# Patient Record
Sex: MALE | Race: BLACK OR AFRICAN AMERICAN | NOT HISPANIC OR LATINO | Employment: OTHER | URBAN - NONMETROPOLITAN AREA
[De-identification: names, ages, dates, MRNs, and addresses within clinical notes are randomized per-mention and may not be internally consistent; named-entity substitution may affect disease eponyms.]

---

## 2017-04-05 ENCOUNTER — APPOINTMENT (OUTPATIENT)
Dept: LAB | Facility: HOSPITAL | Age: 82
End: 2017-04-05

## 2017-04-05 ENCOUNTER — OFFICE VISIT (OUTPATIENT)
Dept: FAMILY MEDICINE CLINIC | Facility: CLINIC | Age: 82
End: 2017-04-05

## 2017-04-05 VITALS
DIASTOLIC BLOOD PRESSURE: 70 MMHG | WEIGHT: 148 LBS | BODY MASS INDEX: 23.23 KG/M2 | SYSTOLIC BLOOD PRESSURE: 130 MMHG | HEIGHT: 67 IN

## 2017-04-05 DIAGNOSIS — M17.32 POST-TRAUMATIC OSTEOARTHRITIS OF LEFT KNEE: ICD-10-CM

## 2017-04-05 DIAGNOSIS — M16.52 POST-TRAUMATIC OSTEOARTHRITIS OF LEFT HIP: ICD-10-CM

## 2017-04-05 DIAGNOSIS — N40.0 BENIGN PROSTATIC HYPERPLASIA WITHOUT LOWER URINARY TRACT SYMPTOMS, UNSPECIFIED MORPHOLOGY: ICD-10-CM

## 2017-04-05 DIAGNOSIS — E55.9 VITAMIN D DEFICIENCY: ICD-10-CM

## 2017-04-05 DIAGNOSIS — K21.9 GASTROESOPHAGEAL REFLUX DISEASE WITHOUT ESOPHAGITIS: ICD-10-CM

## 2017-04-05 DIAGNOSIS — I10 ESSENTIAL HYPERTENSION: Primary | ICD-10-CM

## 2017-04-05 LAB
ALBUMIN SERPL-MCNC: 4.3 G/DL (ref 3.4–4.8)
ALBUMIN/GLOB SERPL: 1.2 G/DL (ref 1.1–1.8)
ALP SERPL-CCNC: 61 U/L (ref 38–126)
ALT SERPL W P-5'-P-CCNC: 14 U/L (ref 21–72)
ANION GAP SERPL CALCULATED.3IONS-SCNC: 14 MMOL/L (ref 5–15)
AST SERPL-CCNC: 35 U/L (ref 17–59)
BILIRUB SERPL-MCNC: 0.6 MG/DL (ref 0.2–1.3)
BUN BLD-MCNC: 18 MG/DL (ref 7–21)
BUN/CREAT SERPL: 15.4 (ref 7–25)
CALCIUM SPEC-SCNC: 9.9 MG/DL (ref 8.4–10.2)
CHLORIDE SERPL-SCNC: 101 MMOL/L (ref 95–110)
CO2 SERPL-SCNC: 25 MMOL/L (ref 22–31)
CREAT BLD-MCNC: 1.17 MG/DL (ref 0.7–1.3)
GFR SERPL CREATININE-BSD FRML MDRD: 72 ML/MIN/1.73 (ref 42–98)
GLOBULIN UR ELPH-MCNC: 3.5 GM/DL (ref 2.3–3.5)
GLUCOSE BLD-MCNC: 127 MG/DL (ref 60–100)
MAGNESIUM SERPL-MCNC: 2.1 MG/DL (ref 1.6–2.3)
POTASSIUM BLD-SCNC: 4.1 MMOL/L (ref 3.5–5.1)
PROT SERPL-MCNC: 7.8 G/DL (ref 6.3–8.6)
SODIUM BLD-SCNC: 140 MMOL/L (ref 137–145)

## 2017-04-05 PROCEDURE — 96372 THER/PROPH/DIAG INJ SC/IM: CPT | Performed by: FAMILY MEDICINE

## 2017-04-05 PROCEDURE — 80053 COMPREHEN METABOLIC PANEL: CPT | Performed by: FAMILY MEDICINE

## 2017-04-05 PROCEDURE — 99214 OFFICE O/P EST MOD 30 MIN: CPT | Performed by: FAMILY MEDICINE

## 2017-04-05 PROCEDURE — 36415 COLL VENOUS BLD VENIPUNCTURE: CPT | Performed by: FAMILY MEDICINE

## 2017-04-05 PROCEDURE — 83735 ASSAY OF MAGNESIUM: CPT | Performed by: FAMILY MEDICINE

## 2017-04-05 RX ORDER — INDAPAMIDE 2.5 MG/1
2.5 TABLET, FILM COATED ORAL DAILY
Qty: 90 TABLET | Refills: 3 | Status: SHIPPED | OUTPATIENT
Start: 2017-04-05 | End: 2018-04-18 | Stop reason: SDUPTHER

## 2017-04-05 RX ORDER — METOPROLOL SUCCINATE 50 MG/1
50 TABLET, EXTENDED RELEASE ORAL DAILY
Qty: 90 TABLET | Refills: 3 | Status: SHIPPED | OUTPATIENT
Start: 2017-04-05 | End: 2018-04-18 | Stop reason: SDUPTHER

## 2017-04-05 RX ORDER — DUTASTERIDE 0.5 MG/1
0.5 CAPSULE, LIQUID FILLED ORAL DAILY
Qty: 90 CAPSULE | Refills: 3 | Status: SHIPPED | OUTPATIENT
Start: 2017-04-05 | End: 2018-04-18 | Stop reason: SDUPTHER

## 2017-04-05 RX ORDER — TRIAMCINOLONE ACETONIDE 40 MG/ML
40 INJECTION, SUSPENSION INTRA-ARTICULAR; INTRAMUSCULAR ONCE
Status: COMPLETED | OUTPATIENT
Start: 2017-04-05 | End: 2017-04-05

## 2017-04-05 RX ORDER — ESOMEPRAZOLE MAGNESIUM 40 MG/1
40 CAPSULE, DELAYED RELEASE ORAL DAILY
Qty: 90 CAPSULE | Refills: 3 | Status: SHIPPED | OUTPATIENT
Start: 2017-04-05 | End: 2017-07-25 | Stop reason: SDUPTHER

## 2017-04-05 RX ORDER — ERGOCALCIFEROL 1.25 MG/1
50000 CAPSULE ORAL
Qty: 3 CAPSULE | Refills: 4 | Status: SHIPPED | OUTPATIENT
Start: 2017-04-05 | End: 2017-07-24 | Stop reason: SDUPTHER

## 2017-04-05 RX ORDER — POTASSIUM CHLORIDE 20 MEQ/1
20 TABLET, EXTENDED RELEASE ORAL DAILY
Qty: 90 TABLET | Refills: 3 | Status: SHIPPED | OUTPATIENT
Start: 2017-04-05 | End: 2018-04-18 | Stop reason: SDUPTHER

## 2017-04-05 RX ADMIN — TRIAMCINOLONE ACETONIDE 40 MG: 40 INJECTION, SUSPENSION INTRA-ARTICULAR; INTRAMUSCULAR at 07:48

## 2017-04-05 NOTE — PROGRESS NOTES
Subjective   Sterling Graves is a 86 y.o. male.     History of Present Illness reevaluation hypertension senile eczema BPH.  In interim having increasing arthritic complaints of right knee and leg following TRAUMA MANY YEARS AGO.  STATES BEEN TAKING ADVIL ABOUT ONCE A WEEK.  REQUESTING STEROID SHOT.  HAVE COUNSELED ON PROS AND CONS AND THE KNEE FOR NO PROLONGED STEROID USE.  HAVE ALSO COUNSELED ON THE POSSIBLE NEED FOR EVALUATION BY ORTHOPEDICS POST JOINT REPLACEMENT AND HE DOES NOT WISH SAME.  IT IS TIME FOR LAB.  GET A TETANUS WHEN HE VOLUNTEERS AT THE HOSPITAL.    The following portions of the patient's history were reviewed and updated as appropriate: allergies, current medications, past family history, past medical history, past social history, past surgical history and problem list.    Review of Systems   Constitutional: Negative for activity change, appetite change, fatigue and unexpected weight change.   HENT: Negative for trouble swallowing and voice change.    Eyes: Negative for redness and visual disturbance.   Respiratory: Negative for cough and wheezing.    Cardiovascular: Negative for chest pain and palpitations.   Gastrointestinal: Negative for abdominal pain, constipation, diarrhea, nausea and vomiting.   Genitourinary: Negative for urgency.   Musculoskeletal: Positive for arthralgias. Negative for joint swelling.   Neurological: Negative for syncope and headaches.   Hematological: Negative for adenopathy.   Psychiatric/Behavioral: Negative for sleep disturbance.       Objective   Physical Exam   HENT:   Head: Normocephalic.   Eyes: Pupils are equal, round, and reactive to light.   Neck: Normal range of motion. Neck supple.   Cardiovascular: Normal rate, regular rhythm, normal heart sounds and intact distal pulses.    Pulmonary/Chest: Effort normal and breath sounds normal.   Abdominal: Soft.   Musculoskeletal: Normal range of motion. He exhibits tenderness (lower extremity shows no swelling  mild crepitance range of motion of the knee and ankle.  1+ pulses no skin breakdown).   Neurological: He is alert.   hard of hearing   Skin: Skin is warm and dry.   Psychiatric: His speech is normal. His mood appears anxious. He exhibits abnormal recent memory.       Assessment/Plan   Problems Addressed this Visit        Cardiovascular and Mediastinum    Essential hypertension - Primary    Relevant Medications    indapamide (LOZOL) 2.5 MG tablet    metoprolol succinate XL (TOPROL-XL) 50 MG 24 hr tablet    potassium chloride (K-DUR,KLOR-CON) 20 MEQ CR tablet    Other Relevant Orders    Comprehensive Metabolic Panel    Magnesium       Digestive    Gastroesophageal reflux disease without esophagitis    Relevant Medications    esomeprazole (nexIUM) 40 MG capsule    Vitamin D deficiency    Relevant Medications    vitamin D (ERGOCALCIFEROL) 80570 UNITS capsule capsule       Musculoskeletal and Integument    Post-traumatic osteoarthritis of left knee    Relevant Medications    triamcinolone acetonide (KENALOG-40) injection 40 mg (Start on 4/5/2017  8:30 AM)    Post-traumatic osteoarthritis of left hip    Relevant Medications    triamcinolone acetonide (KENALOG-40) injection 40 mg (Start on 4/5/2017  8:30 AM)       Genitourinary    Benign prostatic hyperplasia    Relevant Medications    dutasteride (AVODART) 0.5 MG capsule           as above fall prevention discussed.  Lab work recheck 6 months

## 2017-04-18 ENCOUNTER — OFFICE VISIT (OUTPATIENT)
Dept: FAMILY MEDICINE CLINIC | Facility: CLINIC | Age: 82
End: 2017-04-18

## 2017-04-18 ENCOUNTER — HOSPITAL ENCOUNTER (INPATIENT)
Facility: HOSPITAL | Age: 82
LOS: 4 days | Discharge: HOME HEALTH | End: 2017-04-22
Attending: FAMILY MEDICINE | Admitting: FAMILY MEDICINE

## 2017-04-18 ENCOUNTER — APPOINTMENT (OUTPATIENT)
Dept: CT IMAGING | Facility: HOSPITAL | Age: 82
End: 2017-04-18

## 2017-04-18 VITALS
DIASTOLIC BLOOD PRESSURE: 70 MMHG | HEIGHT: 67 IN | SYSTOLIC BLOOD PRESSURE: 110 MMHG | WEIGHT: 142 LBS | BODY MASS INDEX: 22.29 KG/M2

## 2017-04-18 DIAGNOSIS — E86.0 DEHYDRATION: ICD-10-CM

## 2017-04-18 DIAGNOSIS — Z74.09 IMPAIRED MOBILITY AND ACTIVITIES OF DAILY LIVING: ICD-10-CM

## 2017-04-18 DIAGNOSIS — J18.9 PNEUMONIA OF RIGHT LOWER LOBE DUE TO INFECTIOUS ORGANISM: Primary | ICD-10-CM

## 2017-04-18 DIAGNOSIS — R26.9 ABNORMALITY OF GAIT AND MOBILITY: ICD-10-CM

## 2017-04-18 DIAGNOSIS — R10.84 GENERALIZED ABDOMINAL PAIN: Primary | ICD-10-CM

## 2017-04-18 DIAGNOSIS — Z78.9 IMPAIRED MOBILITY AND ACTIVITIES OF DAILY LIVING: ICD-10-CM

## 2017-04-18 LAB
ALBUMIN SERPL-MCNC: 3.8 G/DL (ref 3.4–4.8)
ALBUMIN/GLOB SERPL: 1 G/DL (ref 1.1–1.8)
ALP SERPL-CCNC: 77 U/L (ref 38–126)
ALT SERPL W P-5'-P-CCNC: 16 U/L (ref 21–72)
ANION GAP SERPL CALCULATED.3IONS-SCNC: 13 MMOL/L (ref 5–15)
AST SERPL-CCNC: 44 U/L (ref 17–59)
BACTERIA UR QL AUTO: ABNORMAL /HPF
BASOPHILS # BLD AUTO: 0 10*3/MM3 (ref 0–0.2)
BASOPHILS NFR BLD AUTO: 0 % (ref 0–2)
BILIRUB SERPL-MCNC: 0.9 MG/DL (ref 0.2–1.3)
BILIRUB UR QL STRIP: ABNORMAL
BUN BLD-MCNC: 28 MG/DL (ref 7–21)
BUN/CREAT SERPL: 28.6 (ref 7–25)
CALCIUM SPEC-SCNC: 9.2 MG/DL (ref 8.4–10.2)
CHLORIDE SERPL-SCNC: 96 MMOL/L (ref 95–110)
CLARITY UR: ABNORMAL
CO2 SERPL-SCNC: 27 MMOL/L (ref 22–31)
COLOR UR: ABNORMAL
CREAT BLD-MCNC: 0.98 MG/DL (ref 0.7–1.3)
D-LACTATE SERPL-SCNC: 1.6 MMOL/L (ref 0.5–2)
D-LACTATE SERPL-SCNC: 2.2 MMOL/L (ref 0.5–2)
DEPRECATED RDW RBC AUTO: 43.8 FL (ref 35.1–43.9)
EOSINOPHIL # BLD AUTO: 0.02 10*3/MM3 (ref 0–0.7)
EOSINOPHIL NFR BLD AUTO: 0.2 % (ref 0–7)
ERYTHROCYTE [DISTWIDTH] IN BLOOD BY AUTOMATED COUNT: 13.7 % (ref 11.5–14.5)
GFR SERPL CREATININE-BSD FRML MDRD: 88 ML/MIN/1.73 (ref 42–98)
GLOBULIN UR ELPH-MCNC: 3.7 GM/DL (ref 2.3–3.5)
GLUCOSE BLD-MCNC: 106 MG/DL (ref 60–100)
GLUCOSE UR STRIP-MCNC: NEGATIVE MG/DL
HCT VFR BLD AUTO: 36.2 % (ref 39–49)
HGB BLD-MCNC: 12.4 G/DL (ref 13.7–17.3)
HGB UR QL STRIP.AUTO: ABNORMAL
HOLD SPECIMEN: NORMAL
HOLD SPECIMEN: NORMAL
HYALINE CASTS UR QL AUTO: ABNORMAL /LPF
IMM GRANULOCYTES # BLD: 0.01 10*3/MM3 (ref 0–0.02)
IMM GRANULOCYTES NFR BLD: 0.1 % (ref 0–0.5)
KETONES UR QL STRIP: NEGATIVE
LEUKOCYTE ESTERASE UR QL STRIP.AUTO: ABNORMAL
LIPASE SERPL-CCNC: 42 U/L (ref 23–300)
LYMPHOCYTES # BLD AUTO: 1.07 10*3/MM3 (ref 0.6–4.2)
LYMPHOCYTES NFR BLD AUTO: 12.1 % (ref 10–50)
MCH RBC QN AUTO: 29.6 PG (ref 26.5–34)
MCHC RBC AUTO-ENTMCNC: 34.3 G/DL (ref 31.5–36.3)
MCV RBC AUTO: 86.4 FL (ref 80–98)
MONOCYTES # BLD AUTO: 1.55 10*3/MM3 (ref 0–0.9)
MONOCYTES NFR BLD AUTO: 17.5 % (ref 0–12)
NEUTROPHILS # BLD AUTO: 6.22 10*3/MM3 (ref 2–8.6)
NEUTROPHILS NFR BLD AUTO: 70.1 % (ref 37–80)
NITRITE UR QL STRIP: NEGATIVE
PH UR STRIP.AUTO: 6 [PH] (ref 5–9)
PLATELET # BLD AUTO: 157 10*3/MM3 (ref 150–450)
PMV BLD AUTO: 9.5 FL (ref 8–12)
POTASSIUM BLD-SCNC: 4.1 MMOL/L (ref 3.5–5.1)
PROT SERPL-MCNC: 7.5 G/DL (ref 6.3–8.6)
PROT UR QL STRIP: ABNORMAL
RBC # BLD AUTO: 4.19 10*6/MM3 (ref 4.37–5.74)
RBC # UR: ABNORMAL /HPF
REF LAB TEST METHOD: ABNORMAL
SODIUM BLD-SCNC: 136 MMOL/L (ref 137–145)
SP GR UR STRIP: 1.02 (ref 1–1.03)
SQUAMOUS #/AREA URNS HPF: ABNORMAL /HPF
TROPONIN I SERPL-MCNC: <0.012 NG/ML
UROBILINOGEN UR QL STRIP: ABNORMAL
WBC NRBC COR # BLD: 8.87 10*3/MM3 (ref 3.2–9.8)
WBC UR QL AUTO: ABNORMAL /HPF
WHOLE BLOOD HOLD SPECIMEN: NORMAL
WHOLE BLOOD HOLD SPECIMEN: NORMAL

## 2017-04-18 PROCEDURE — 94799 UNLISTED PULMONARY SVC/PX: CPT

## 2017-04-18 PROCEDURE — 25010000002 MORPHINE SULFATE (PF) 2 MG/ML SOLUTION: Performed by: HOSPITALIST

## 2017-04-18 PROCEDURE — 81001 URINALYSIS AUTO W/SCOPE: CPT | Performed by: FAMILY MEDICINE

## 2017-04-18 PROCEDURE — 25010000002 AZITHROMYCIN: Performed by: FAMILY MEDICINE

## 2017-04-18 PROCEDURE — 87040 BLOOD CULTURE FOR BACTERIA: CPT | Performed by: FAMILY MEDICINE

## 2017-04-18 PROCEDURE — 80053 COMPREHEN METABOLIC PANEL: CPT | Performed by: FAMILY MEDICINE

## 2017-04-18 PROCEDURE — 99284 EMERGENCY DEPT VISIT MOD MDM: CPT

## 2017-04-18 PROCEDURE — 83605 ASSAY OF LACTIC ACID: CPT | Performed by: FAMILY MEDICINE

## 2017-04-18 PROCEDURE — 84484 ASSAY OF TROPONIN QUANT: CPT | Performed by: FAMILY MEDICINE

## 2017-04-18 PROCEDURE — 25010000002 ONDANSETRON PER 1 MG: Performed by: FAMILY MEDICINE

## 2017-04-18 PROCEDURE — 74177 CT ABD & PELVIS W/CONTRAST: CPT

## 2017-04-18 PROCEDURE — 83690 ASSAY OF LIPASE: CPT | Performed by: FAMILY MEDICINE

## 2017-04-18 PROCEDURE — 25010000002 CEFTRIAXONE: Performed by: FAMILY MEDICINE

## 2017-04-18 PROCEDURE — 85025 COMPLETE CBC W/AUTO DIFF WBC: CPT | Performed by: FAMILY MEDICINE

## 2017-04-18 PROCEDURE — 94640 AIRWAY INHALATION TREATMENT: CPT

## 2017-04-18 PROCEDURE — 99214 OFFICE O/P EST MOD 30 MIN: CPT | Performed by: FAMILY MEDICINE

## 2017-04-18 PROCEDURE — 0 IOPAMIDOL 61 % SOLUTION: Performed by: FAMILY MEDICINE

## 2017-04-18 RX ORDER — SODIUM CHLORIDE 9 MG/ML
500 INJECTION, SOLUTION INTRAVENOUS ONCE
Status: COMPLETED | OUTPATIENT
Start: 2017-04-18 | End: 2017-04-18

## 2017-04-18 RX ORDER — IPRATROPIUM BROMIDE AND ALBUTEROL SULFATE 2.5; .5 MG/3ML; MG/3ML
3 SOLUTION RESPIRATORY (INHALATION)
Status: DISCONTINUED | OUTPATIENT
Start: 2017-04-18 | End: 2017-04-22 | Stop reason: HOSPADM

## 2017-04-18 RX ORDER — SODIUM CHLORIDE 0.9 % (FLUSH) 0.9 %
10 SYRINGE (ML) INJECTION AS NEEDED
Status: DISCONTINUED | OUTPATIENT
Start: 2017-04-18 | End: 2017-04-22 | Stop reason: HOSPADM

## 2017-04-18 RX ORDER — INDAPAMIDE 2.5 MG/1
2.5 TABLET, FILM COATED ORAL DAILY
Status: DISCONTINUED | OUTPATIENT
Start: 2017-04-18 | End: 2017-04-22 | Stop reason: HOSPADM

## 2017-04-18 RX ORDER — SODIUM CHLORIDE 0.9 % (FLUSH) 0.9 %
1-10 SYRINGE (ML) INJECTION AS NEEDED
Status: DISCONTINUED | OUTPATIENT
Start: 2017-04-18 | End: 2017-04-22 | Stop reason: HOSPADM

## 2017-04-18 RX ORDER — ALBUTEROL SULFATE 2.5 MG/3ML
2.5 SOLUTION RESPIRATORY (INHALATION)
Status: DISCONTINUED | OUTPATIENT
Start: 2017-04-18 | End: 2017-04-18

## 2017-04-18 RX ORDER — METOPROLOL SUCCINATE 50 MG/1
50 TABLET, EXTENDED RELEASE ORAL DAILY
Status: DISCONTINUED | OUTPATIENT
Start: 2017-04-18 | End: 2017-04-22 | Stop reason: HOSPADM

## 2017-04-18 RX ORDER — MORPHINE SULFATE 2 MG/ML
1 INJECTION, SOLUTION INTRAMUSCULAR; INTRAVENOUS EVERY 4 HOURS PRN
Status: DISCONTINUED | OUTPATIENT
Start: 2017-04-18 | End: 2017-04-22 | Stop reason: HOSPADM

## 2017-04-18 RX ORDER — SODIUM CHLORIDE 9 MG/ML
75 INJECTION, SOLUTION INTRAVENOUS CONTINUOUS
Status: DISCONTINUED | OUTPATIENT
Start: 2017-04-18 | End: 2017-04-22 | Stop reason: HOSPADM

## 2017-04-18 RX ORDER — PANTOPRAZOLE SODIUM 40 MG/1
40 TABLET, DELAYED RELEASE ORAL
Status: DISCONTINUED | OUTPATIENT
Start: 2017-04-19 | End: 2017-04-22 | Stop reason: HOSPADM

## 2017-04-18 RX ORDER — ACETAMINOPHEN 325 MG/1
650 TABLET ORAL EVERY 4 HOURS PRN
Status: DISCONTINUED | OUTPATIENT
Start: 2017-04-18 | End: 2017-04-22 | Stop reason: HOSPADM

## 2017-04-18 RX ORDER — ONDANSETRON 4 MG/1
4 TABLET, ORALLY DISINTEGRATING ORAL EVERY 6 HOURS PRN
Status: DISCONTINUED | OUTPATIENT
Start: 2017-04-18 | End: 2017-04-22 | Stop reason: HOSPADM

## 2017-04-18 RX ORDER — DUTASTERIDE 0.5 MG/1
0.5 CAPSULE, LIQUID FILLED ORAL DAILY
Status: DISCONTINUED | OUTPATIENT
Start: 2017-04-18 | End: 2017-04-22 | Stop reason: HOSPADM

## 2017-04-18 RX ORDER — ONDANSETRON 2 MG/ML
4 INJECTION INTRAMUSCULAR; INTRAVENOUS EVERY 6 HOURS PRN
Status: DISCONTINUED | OUTPATIENT
Start: 2017-04-18 | End: 2017-04-22 | Stop reason: HOSPADM

## 2017-04-18 RX ORDER — DOCUSATE SODIUM 100 MG/1
100 CAPSULE, LIQUID FILLED ORAL 2 TIMES DAILY
Status: DISCONTINUED | OUTPATIENT
Start: 2017-04-18 | End: 2017-04-22 | Stop reason: HOSPADM

## 2017-04-18 RX ORDER — ONDANSETRON 4 MG/1
4 TABLET, FILM COATED ORAL EVERY 6 HOURS PRN
Status: DISCONTINUED | OUTPATIENT
Start: 2017-04-18 | End: 2017-04-22 | Stop reason: HOSPADM

## 2017-04-18 RX ORDER — POLYETHYLENE GLYCOL 3350 17 G/17G
17 POWDER, FOR SOLUTION ORAL DAILY
Status: DISCONTINUED | OUTPATIENT
Start: 2017-04-18 | End: 2017-04-22 | Stop reason: HOSPADM

## 2017-04-18 RX ORDER — ONDANSETRON 2 MG/ML
4 INJECTION INTRAMUSCULAR; INTRAVENOUS ONCE
Status: COMPLETED | OUTPATIENT
Start: 2017-04-18 | End: 2017-04-18

## 2017-04-18 RX ORDER — POTASSIUM CHLORIDE 750 MG/1
20 CAPSULE, EXTENDED RELEASE ORAL DAILY
Status: DISCONTINUED | OUTPATIENT
Start: 2017-04-18 | End: 2017-04-22 | Stop reason: HOSPADM

## 2017-04-18 RX ADMIN — DOCUSATE SODIUM 100 MG: 100 CAPSULE, LIQUID FILLED ORAL at 22:23

## 2017-04-18 RX ADMIN — ONDANSETRON 4 MG: 2 INJECTION INTRAMUSCULAR; INTRAVENOUS at 14:53

## 2017-04-18 RX ADMIN — SODIUM CHLORIDE 500 ML: 9 INJECTION, SOLUTION INTRAVENOUS at 14:54

## 2017-04-18 RX ADMIN — IOPAMIDOL 75 ML: 612 INJECTION, SOLUTION INTRAVENOUS at 16:30

## 2017-04-18 RX ADMIN — SODIUM CHLORIDE 75 ML/HR: 900 INJECTION, SOLUTION INTRAVENOUS at 22:26

## 2017-04-18 RX ADMIN — AZITHROMYCIN 500 MG: 500 INJECTION, POWDER, LYOPHILIZED, FOR SOLUTION INTRAVENOUS at 19:08

## 2017-04-18 RX ADMIN — Medication 10 ML: at 16:30

## 2017-04-18 RX ADMIN — IPRATROPIUM BROMIDE AND ALBUTEROL SULFATE 3 ML: 2.5; .5 SOLUTION RESPIRATORY (INHALATION) at 22:59

## 2017-04-18 RX ADMIN — POLYETHYLENE GLYCOL 3350 17 G: 17 POWDER, FOR SOLUTION ORAL at 22:23

## 2017-04-18 RX ADMIN — CEFTRIAXONE 1 G: 1 INJECTION, POWDER, FOR SOLUTION INTRAMUSCULAR; INTRAVENOUS at 16:33

## 2017-04-18 RX ADMIN — MORPHINE SULFATE 1 MG: 2 INJECTION, SOLUTION INTRAMUSCULAR; INTRAVENOUS at 18:55

## 2017-04-18 RX ADMIN — Medication 10 ML: at 13:53

## 2017-04-18 NOTE — H&P
"      [facility] Medicine Admission      Date of Admission: 2017      Primary Care Physician: Augustin Jones MD      Chief Complaint: Vomiting, right flank pain    HPI: 86-year-old -American male who presented to the emergency department today after being sent from his primary care provider's office.  Primary care provider reported that the patient was having abdominal pain, constipation, nausea and vomiting and there was concern for acute abdomen.  However workup in the emergency department including CT of the abdomen and pelvis was unremarkable for any acute abdomen, however the patient was noted to have right lower lobe pneumonia.  Upon evaluation in the emergency department the patient makes no mention of abdominal pain.  He reports that he has had nausea and vomiting for the past 4 days in approximately 6-8 pound weight loss.  His grandson reports that he has been having chills and \"rattling when he breathes.\"  He is being admitted for treatment of right lower lobe pneumonia.    Concurrent Medical History: BPH, hypertension, osteoarthritis, peptic ulcer disease, vertigo, esophagitis    Past Surgical History: Bilateral leg fracture repairs, G-tube placement, right total knee, right shoulder rotator cuff repair, cataract surgery    Family History: Family history significant for type 2 diabetes in the patient's mother is  patient also reports having siblings with history of type 2 diabetes and cancer however the patient is unable to recall what type of cancer her sister had.    Social History: Patient denies alcohol or illicit drug use.  Patient is a former pipe smoker but has not smoked since .    Allergies: No Known Allergies    Medications:    Current medications include:  Avodart 0.5 mg daily  Nexium 40 mg daily  Lozol 2.5 mg daily  Toprol-XL 50 mg daily  Potassium 20 mEq daily  Vitamin D 50,000 units once a month    Review of Systems:  Review of " "Systems   Constitutional: Positive for chills and fatigue. Negative for fever.   Respiratory: Positive for cough. Negative for chest tightness, shortness of breath and wheezing.         \"rattling in my chest\"   Cardiovascular: Negative for chest pain and palpitations.   Gastrointestinal: Positive for constipation, nausea and vomiting. Negative for diarrhea.   Genitourinary: Positive for flank pain.        Right flank pain   Musculoskeletal: Positive for back pain, myalgias and neck pain.   Neurological: Negative for dizziness, weakness and light-headedness.   Psychiatric/Behavioral: Negative for confusion.      Otherwise complete ROS is negative except as mentioned above.    Physical Exam:   Temp:  [98.9 °F (37.2 °C)] 98.9 °F (37.2 °C)  Heart Rate:  [71-78] 73  Resp:  [16-20] 20  BP: (110-188)/(58-89) 177/75  Physical Exam   Constitutional: He is oriented to person, place, and time. Vital signs are normal. He appears cachectic. He is cooperative.   HENT:   Head: Normocephalic.   Eyes: Conjunctivae are normal.   Neck: Neck supple.   Cardiovascular: Normal rate, regular rhythm, normal heart sounds and intact distal pulses.    Pulmonary/Chest: Effort normal. No respiratory distress. He has decreased breath sounds in the right lower field and the left lower field.   Abdominal: Soft. Bowel sounds are normal. He exhibits no distension. There is no tenderness.   Neurological: He is alert and oriented to person, place, and time.   Skin: Skin is warm and dry.   Psychiatric: He has a normal mood and affect. His behavior is normal.   Vitals reviewed.        Results Reviewed:  I have personally reviewed current lab, radiology, and data and agree with results.  Lab Results (last 24 hours)     Procedure Component Value Units Date/Time    CBC & Differential [[id number]] Collected:  04/18/17 1353    Specimen:  Blood Updated:  04/18/17 1406    Narrative:       The following orders were created for panel order CBC & " Differential.  Procedure                               Abnormality         Status                     ---------                               -----------         ------                     CBC Auto Differential[[id number]]         Abnormal            Final result                 Please view results for these tests on the individual orders.    CBC Auto Differential [[id number]]  (Abnormal) Collected:  04/18/17 1353    Specimen:  Blood Updated:  04/18/17 1406     WBC 8.87 10*3/mm3      RBC 4.19 (L) 10*6/mm3      Hemoglobin 12.4 (L) g/dL      Hematocrit 36.2 (L) %      MCV 86.4 fL      MCH 29.6 pg      MCHC 34.3 g/dL      RDW 13.7 %      RDW-SD 43.8 fl      MPV 9.5 fL      Platelets 157 10*3/mm3      Neutrophil % 70.1 %      Lymphocyte % 12.1 %      Monocyte % 17.5 (H) %      Eosinophil % 0.2 %      Basophil % 0.0 %      Immature Grans % 0.1 %      Neutrophils, Absolute 6.22 10*3/mm3      Lymphocytes, Absolute 1.07 10*3/mm3      Monocytes, Absolute 1.55 (H) 10*3/mm3      Eosinophils, Absolute 0.02 10*3/mm3      Basophils, Absolute 0.00 10*3/mm3      Immature Grans, Absolute 0.01 10*3/mm3     Comprehensive Metabolic Panel [[id number]]  (Abnormal) Collected:  04/18/17 1353    Specimen:  Blood Updated:  04/18/17 1417     Glucose 106 (H) mg/dL      BUN 28 (H) mg/dL      Creatinine 0.98 mg/dL      Sodium 136 (L) mmol/L      Potassium 4.1 mmol/L      Chloride 96 mmol/L      CO2 27.0 mmol/L      Calcium 9.2 mg/dL      Total Protein 7.5 g/dL      Albumin 3.80 g/dL      ALT (SGPT) 16 (L) U/L      AST (SGOT) 44 U/L      Alkaline Phosphatase 77 U/L      Total Bilirubin 0.9 mg/dL      eGFR  African Amer 88 mL/min/1.73      Globulin 3.7 (H) gm/dL      A/G Ratio 1.0 (L) g/dL      BUN/Creatinine Ratio 28.6 (H)     Anion Gap 13.0 mmol/L     Narrative:       The MDRD GFR formula is only valid for adults with stable renal function between ages 18 and 70.    Lipase [[id number]]  (Normal) Collected:  04/18/17 1353    Specimen:  Blood Updated:   04/18/17 1417     Lipase 42 U/L     Troponin [[id number]]  (Normal) Collected:  04/18/17 1353    Specimen:  Blood Updated:  04/18/17 1429     Troponin I <0.012 ng/mL     [place] Draw [[id number]] Collected:  04/18/17 1353    Specimen:  Blood Updated:  04/18/17 1502    Narrative:       The following orders were created for panel order [place] Draw.  Procedure                               Abnormality         Status                     ---------                               -----------         ------                     Light Blue Top[[id number]]                                    Final result               Green Top (Gel)[[id number]]                                   Final result               Lavender Top[[id number]]                                      Final result               Gold Top - SST[[id number]]                                    Final result                 Please view results for these tests on the individual orders.    Light Blue Top [[id number]] Collected:  04/18/17 1353    Specimen:  Blood Updated:  04/18/17 1502     Extra Tube hold for add-on      Auto resulted       Green Top (Gel) [[id number]] Collected:  04/18/17 1353    Specimen:  Blood Updated:  04/18/17 1502     Extra Tube Hold for add-ons.      Auto resulted.       Lavender Top [[id number]] Collected:  04/18/17 1353    Specimen:  Blood Updated:  04/18/17 1502     Extra Tube hold for add-on      Auto resulted       Gold Top - SST [[id number]] Collected:  04/18/17 1353    Specimen:  Blood Updated:  04/18/17 1502     Extra Tube Hold for add-ons.      Auto resulted.       Urinalysis With / Culture If Indicated [[id number]]  (Abnormal) Collected:  04/18/17 1438    Specimen:  Urine from Urine, Clean Catch Updated:  04/18/17 1505     Color, UA Orange (A)     Appearance, UA Slightly Cloudy (A)     pH, UA 6.0     Specific Gravity, UA 1.020     Glucose, UA Negative     Ketones, UA Negative     Bilirubin, UA Small (1+) (A)     Blood, UA Trace (A)     Protein,  mg/dL  (2+) (A)     Leuk Esterase, UA Trace (A)     Nitrite, UA Negative     Urobilinogen, UA 4.0 E.U./dL (A)    Lactic Acid, Plasma [[id number]]  (Abnormal) Collected:  04/18/17 1456    Specimen:  Blood from Arm, Left Updated:  04/18/17 1523     Lactate 2.2 (C) mmol/L     Urinalysis, Microscopic Only [[id number]]  (Abnormal) Collected:  04/18/17 1438    Specimen:  Urine from Urine, Clean Catch Updated:  04/18/17 1643     RBC, UA None Seen /HPF      WBC, UA 3-5 /HPF      Bacteria, UA Trace (A) /HPF      Squamous Epithelial Cells, UA 0-2 /HPF      Hyaline Casts, UA 0-2 /LPF      Methodology Automated Microscopy        Imaging Results (last 24 hours)     Procedure Component Value Units Date/Time    CT Abdomen Pelvis With Contrast [[id number]] Collected:  04/18/17 1640     Updated:  04/18/17 1655    Narrative:         EXAMINATION:  Computed Tomography           REGION:    Abdomen / Pelvis                     INDICATION:    Generalized abdominal pain      HISTORY:    Prostate carcinoma      LINDA. IMAGING:    none            TECHNIQUE:      - reconstructions:    axial, coronal, sagittal         - contrast:      oral:  Yes ;   intravenous:  Yes -Isovue 300,  80 mL     This exam was performed according to the departmental  dose-optimization program which includes automated exposure  control, adjustment of the mA and/or kV according to patient size  and/or use of iterative reconstruction technique.           COMMENTS:            - - - CT ABDOMEN - - -          THORAX (INFERIOR):      - LUNG BASES:  clear      - PLEURA:    no fluid or mass      - HEART:    normal size, no pericardial fluid     - MISC:      n/a          ABDOMEN:     - LIVER:    normal size / contour, no ductal dilatation , single  focal hypoattenuating lesion in the left lobe measuring 0.8 cm,  nonenhancing, presumably of a benign etiology.   - GB:      grossly negative    - CBD:      grossly negative   - SPLEEN:    normal size and contour   - PANCREAS:    normal in size,  contour, no focal mass    - VISCERA:    normal caliber, no wall thickening     - MESENTERY:  no mesenteric mass   - CAVITY:    no free abdominal fluid, no free intraperitoneal  air   - BODY WALL:  wnl   - OSSEOUS:  Degenerative changes associated with the inferior  lumbar spine with moderate to high-grade acquired spinal stenosis  at the L4/5 and L3/4 levels with high-grade spinal neural  foraminal narrowing on the right at these levels.   - MISC:                                      RETROPERITONEUM:   - KIDNEYS:    normal size / contour, no collecting system  dilation; multiple nonenhancing hypoattenuating lesions, likely  cysts. No evidence of an enhancing mass.   - URETERS:    normal course, caliber   - ADRENALS:    normal size, contour   - MISC:      no sig retroperitoneal adenopathy or mass   - VASCULAR:    aorta / iliacs: wnl for age     - - - CT PELVIS - - -      - VISCERA:    normal caliber small/large bowel, no focal  thickening/mass    - MESENTERY:  no mass   - VASCULAR:    wnl for age   - CAVITY:    no free fluid / air   - BLADDER:    unremarkable   - OSSEOUS:    wnl    - MISC:   - PROSTATE:  1.3 cm bulge in the right peripheral zone with mild  enhancement, suspicious for the presence of a neoplastic process.  Suggest correlation with PSA. (Axial 78/103)     .       Impression:        CONCLUSION:  1. No evidence of an acute intra-abdominal/pelvic process.  2. Right lower lobe airspace disease likely representing  pneumonia.  3. Multilevel degenerative changes in the lumbar spine as  described above, suggest correlation with MRI.  4. Nodule in the prostate gland, suspicious for the presence of a  neoplastic process, and correlation with PSA is recommended.  Biopsy suggested.      Electronically signed by:  GLENIS Gutierrez MD  4/18/2017 4:54  PM CDT Workstation: [id number]            Assessment/Plan:  1.  Right lower lobe pneumonia: IV antibiotics, nebulizer treatments, oxygen therapy, incentive  spirometry.  #2.  Acute cystitis: Cultures pending, IV antibiotics will be given.  #3.  Mild dehydration: IV hydration  #4 hypertension: Continue patient's home medications.  #5 history of peptic ulcer disease  #6 history BPH  #7 prostate nodules noted on CT scan    Plan of care has been discussed with the patient and his grandchildren who are at bedside.  Approximately 1 hour spent on the admission process for this patient.  Further orders and the patient will depend upon the hospital course.  CODE STATUS was discussed with the patient at this time he wishes to be full CODE STATUS.    Magda Chavez, LEONIE  04/18/17  5:54 PM

## 2017-04-18 NOTE — PROGRESS NOTES
Subjective   Sterling Graves is a 86 y.o. male.     History of Present Illness chief complaint of abdominal pain no bowel movement past 4-5 days.  Pain in right side and mainly.  Decreased oral intake associated with same.  No known fever.  Past history significant for inguinal hernia repair.    The following portions of the patient's history were reviewed and updated as appropriate: allergies, current medications, past family history, past medical history, past social history, past surgical history and problem list.    Review of Systems   Constitutional: Positive for unexpected weight change. Negative for activity change, appetite change and fatigue.   HENT: Negative for trouble swallowing and voice change.    Eyes: Negative for redness and visual disturbance.   Respiratory: Negative for cough and wheezing.    Cardiovascular: Negative for chest pain and palpitations.   Gastrointestinal: Positive for abdominal distention, abdominal pain, constipation and nausea. Negative for diarrhea and vomiting.   Genitourinary: Negative for urgency.   Musculoskeletal: Negative for joint swelling.   Neurological: Negative for syncope and headaches.   Hematological: Negative for adenopathy.   Psychiatric/Behavioral: Negative for sleep disturbance.       Objective   Physical Exam   Constitutional: He appears cachectic. He has a sickly appearance.   HENT:   Head: Normocephalic.   Neck: Trachea normal.   Cardiovascular: Normal rate, regular rhythm, S1 normal and normal heart sounds.    Rate 90   Pulmonary/Chest: Effort normal and breath sounds normal.   Abdominal: Bowel sounds are absent. There is tenderness (mild diffuse tenderness.  Bowel sounds.No guarding rebound but not reproducible.).   Neurological:   Reflex Scores:       Patellar reflexes are 2+ on the right side and 2+ on the left side.  Psychiatric: His speech is normal. His mood appears anxious.       Assessment/Plan   Sterling was seen today for abdominal  pain.    Diagnoses and all orders for this visit:    Generalized abdominal pain    Dehydration       Dehydration and acute pain no bowel sounds possible surgical abdomen.  Patient transferred to emergency room.  Emergency room has been notified.

## 2017-04-18 NOTE — ED PROVIDER NOTES
Subjective   Patient is a 86 y.o. male presenting with abdominal pain.   History provided by:  Patient  Abdominal Pain   Pain location:  Generalized  Pain quality: aching, dull and fullness    Pain radiates to:  Does not radiate  Pain severity:  Moderate  Onset quality:  Sudden  Duration:  2 days  Timing:  Constant  Progression:  Worsening  Chronicity:  New  Context: retching    Context: not alcohol use, not awakening from sleep, not diet changes, not eating, not medication withdrawal, not previous surgeries, not recent illness, not recent sexual activity and not sick contacts    Relieved by:  Nothing  Worsened by:  Nothing  Ineffective treatments:  None tried  Associated symptoms: no anorexia, no belching, no chest pain, no chills, no cough, no dysuria, no fatigue, no fever, no flatus, no melena, no nausea and no shortness of breath        Review of Systems   Constitutional: Negative.  Negative for chills, fatigue and fever.   HENT: Negative.    Respiratory: Negative.  Negative for cough and shortness of breath.    Cardiovascular: Negative.  Negative for chest pain.   Gastrointestinal: Positive for abdominal pain. Negative for anorexia, flatus, melena and nausea.   Genitourinary: Negative for dysuria.   Musculoskeletal: Negative.    Hematological: Negative.    Psychiatric/Behavioral: Negative.        Past Medical History:   Diagnosis Date   • Allergic rhinitis    • Benign prostatic hyperplasia    • Cataract    • Chalazion of left lower eyelid    • Corneal abrasion, left    • Degenerative joint disease involving multiple joints    • Dizziness    • Eczema    • Essential hypertension    • Hearing loss    • History of pneumococcal vaccination    • Inguinal hernia    • Long term use of drug    • Male erectile disorder    • Onychomycosis    • Peptic ulcer    • Pseudophakia    • Rotator cuff syndrome     right shoulder   • Upper respiratory infection    • Vertigo    • Vitreous detachment     posterior with small  "hemorrhage          No Known Allergies    Past Surgical History:   Procedure Laterality Date   • ANKLE SURGERY  05/12/1960   • CATARACT EXTRACTION  02/13/2002   • ENDOSCOPY  11/08/2011    w/tube   • FOOT SURGERY  11/04/1997   • GTUBE INSERTION  11/11/2011   • INGUINAL HERNIA REPAIR  05/24/1999   • KNEE SURGERY  11/09/1991   • KNEE SURGERY  09/14/1990   • SHOULDER SURGERY  10/12/2004   • SKIN BIOPSY  10/23/2002   • TOTAL KNEE ARTHROPLASTY  09/23/1991       Family History   Problem Relation Age of Onset   • Hypertension Other        Social History     Social History   • Marital status:      Spouse name: N/A   • Number of children: N/A   • Years of education: N/A     Social History Main Topics   • Smoking status: Never Smoker   • Smokeless tobacco: None   • Alcohol use No   • Drug use: None   • Sexual activity: Not Asked     Other Topics Concern   • None     Social History Narrative           Objective    /84 (BP Location: Right arm, Patient Position: Lying)  Pulse 70  Temp 98.9 °F (37.2 °C) (Oral)   Resp 18  Ht 67\" (170.2 cm)  Wt 142 lb (64.4 kg)  SpO2 96%  BMI 22.24 kg/m2    Physical Exam   Constitutional: He is oriented to person, place, and time. He appears well-developed and well-nourished.   HENT:   Head: Normocephalic and atraumatic.   Right Ear: External ear normal.   Left Ear: External ear normal.   Nose: Nose normal.   Mouth/Throat: Oropharynx is clear and moist.   Eyes: Conjunctivae and EOM are normal. Pupils are equal, round, and reactive to light.   Neck: Normal range of motion. Neck supple.   Cardiovascular: Normal rate, regular rhythm, normal heart sounds and intact distal pulses.    Pulmonary/Chest: Effort normal and breath sounds normal. No accessory muscle usage. No respiratory distress. He exhibits tenderness. He exhibits no deformity.       Abdominal: Soft. Bowel sounds are decreased. There is tenderness in the right lower quadrant, periumbilical area and suprapubic area. "   Musculoskeletal: Normal range of motion.   Neurological: He is alert and oriented to person, place, and time. He has normal reflexes.   Skin: Skin is warm.   Psychiatric: He has a normal mood and affect. His behavior is normal. Judgment and thought content normal.   Nursing note and vitals reviewed.      Procedures         ED Course  ED Course      Labs Reviewed   COMPREHENSIVE METABOLIC PANEL - Abnormal; Notable for the following:        Result Value    Glucose 106 (*)     BUN 28 (*)     Sodium 136 (*)     ALT (SGPT) 16 (*)     Globulin 3.7 (*)     A/G Ratio 1.0 (*)     BUN/Creatinine Ratio 28.6 (*)     All other components within normal limits    Narrative:     The MDRD GFR formula is only valid for adults with stable renal function between ages 18 and 70.   URINALYSIS W/ CULTURE IF INDICATED - Abnormal; Notable for the following:     Color, UA Orange (*)     Appearance, UA Slightly Cloudy (*)     Bilirubin, UA Small (1+) (*)     Blood, UA Trace (*)     Protein,  mg/dL (2+) (*)     Leuk Esterase, UA Trace (*)     Urobilinogen, UA 4.0 E.U./dL (*)     All other components within normal limits   CBC WITH AUTO DIFFERENTIAL - Abnormal; Notable for the following:     RBC 4.19 (*)     Hemoglobin 12.4 (*)     Hematocrit 36.2 (*)     Monocyte % 17.5 (*)     Monocytes, Absolute 1.55 (*)     All other components within normal limits   LACTIC ACID, PLASMA - Abnormal; Notable for the following:     Lactate 2.2 (*)     All other components within normal limits   URINALYSIS, MICROSCOPIC ONLY - Abnormal; Notable for the following:     Bacteria, UA Trace (*)     All other components within normal limits   LIPASE - Normal   TROPONIN (IN-HOUSE) - Normal   BLOOD CULTURE   BLOOD CULTURE   RAINBOW DRAW    Narrative:     The following orders were created for panel order [place] Draw.  Procedure                               Abnormality         Status                     ---------                               -----------          ------                     Light Blue Top[[id number]]                                    Final result               Green Top (Gel)[[id number]]                                   Final result               Lavender Top[[id number]]                                      Final result               Gold Top - SST[[id number]]                                    Final result                 Please view results for these tests on the individual orders.   LACTATE ACID, REFLEX   CBC AND DIFFERENTIAL    Narrative:     The following orders were created for panel order CBC & Differential.  Procedure                               Abnormality         Status                     ---------                               -----------         ------                     CBC Auto Differential[[id number]]         Abnormal            Final result                 Please view results for these tests on the individual orders.   LIGHT BLUE TOP   GREEN TOP   LAVENDER TOP   GOLD TOP - SST     Ct Abdomen Pelvis With Contrast    Result Date: 4/18/2017  Narrative: EXAMINATION:  Computed Tomography         REGION:    Abdomen / Pelvis                 INDICATION:    Generalized abdominal pain    HISTORY:    Prostate carcinoma    LINDA. IMAGING:    none        TECHNIQUE:    - reconstructions:    axial, coronal, sagittal       - contrast:      oral:  Yes ;   intravenous:  Yes -Isovue 300, 80 mL   This exam was performed according to the departmental dose-optimization program which includes automated exposure control, adjustment of the mA and/or kV according to patient size and/or use of iterative reconstruction technique.       COMMENTS:        - - - CT ABDOMEN - - -      THORAX (INFERIOR):    - LUNG BASES:  clear    - PLEURA:    no fluid or mass    - HEART:    normal size, no pericardial fluid   - MISC:      n/a      ABDOMEN:   - LIVER:    normal size / contour, no ductal dilatation , single focal hypoattenuating lesion in the left lobe measuring 0.8 cm,  nonenhancing, presumably of a benign etiology.  - GB:      grossly negative  - CBD:      grossly negative  - SPLEEN:    normal size and contour  - PANCREAS:    normal in size, contour, no focal mass  - VISCERA:    normal caliber, no wall thickening   - MESENTERY:  no mesenteric mass  - CAVITY:    no free abdominal fluid, no free intraperitoneal air  - BODY WALL:  wnl  - OSSEOUS:  Degenerative changes associated with the inferior lumbar spine with moderate to high-grade acquired spinal stenosis at the L4/5 and L3/4 levels with high-grade spinal neural foraminal narrowing on the right at these levels.  - MISC:                                  RETROPERITONEUM:  - KIDNEYS:    normal size / contour, no collecting system dilation; multiple nonenhancing hypoattenuating lesions, likely cysts. No evidence of an enhancing mass.  - URETERS:    normal course, caliber  - ADRENALS:    normal size, contour  - MISC:      no sig retroperitoneal adenopathy or mass  - VASCULAR:    aorta / iliacs: wnl for age  - - - CT PELVIS - - -  - VISCERA:    normal caliber small/large bowel, no focal thickening/mass    - MESENTERY:  no mass  - VASCULAR:    wnl for age  - CAVITY:    no free fluid / air  - BLADDER:    unremarkable  - OSSEOUS:    wnl  - MISC:  - PROSTATE:  1.3 cm bulge in the right peripheral zone with mild enhancement, suspicious for the presence of a neoplastic process. Suggest correlation with PSA. (Axial 78/103)  .       Impression:  CONCLUSION: 1. No evidence of an acute intra-abdominal/pelvic process. 2. Right lower lobe airspace disease likely representing pneumonia. 3. Multilevel degenerative changes in the lumbar spine as described above, suggest correlation with MRI. 4. Nodule in the prostate gland, suspicious for the presence of a neoplastic process, and correlation with PSA is recommended. Biopsy suggested. Electronically signed by:  GLENIS Gutierrez MD  4/18/2017 4:54 PM CDT Workstation: [id number]    Pneumonia with  elevated BUN   CURB score is high - admit for iv antibiotics.                 MDM  Number of Diagnoses or Management Options  Dehydration:   Pneumonia of right lower lobe due to infectious organism:       Final diagnoses:   Pneumonia of right lower lobe due to infectious organism   Dehydration            Jen Pelayo MD  04/18/17 [id number]

## 2017-04-19 LAB
ANION GAP SERPL CALCULATED.3IONS-SCNC: 10 MMOL/L (ref 5–15)
BUN BLD-MCNC: 17 MG/DL (ref 7–21)
BUN/CREAT SERPL: 21.8 (ref 7–25)
CALCIUM SPEC-SCNC: 8.8 MG/DL (ref 8.4–10.2)
CHLORIDE SERPL-SCNC: 103 MMOL/L (ref 95–110)
CO2 SERPL-SCNC: 25 MMOL/L (ref 22–31)
CREAT BLD-MCNC: 0.78 MG/DL (ref 0.7–1.3)
D-LACTATE SERPL-SCNC: 1.7 MMOL/L (ref 0.5–2)
DEPRECATED RDW RBC AUTO: 43.7 FL (ref 35.1–43.9)
ERYTHROCYTE [DISTWIDTH] IN BLOOD BY AUTOMATED COUNT: 13.7 % (ref 11.5–14.5)
GFR SERPL CREATININE-BSD FRML MDRD: 114 ML/MIN/1.73 (ref 42–98)
GLUCOSE BLD-MCNC: 113 MG/DL (ref 60–100)
HCT VFR BLD AUTO: 35.5 % (ref 39–49)
HGB BLD-MCNC: 12.1 G/DL (ref 13.7–17.3)
MCH RBC QN AUTO: 29.7 PG (ref 26.5–34)
MCHC RBC AUTO-ENTMCNC: 34.1 G/DL (ref 31.5–36.3)
MCV RBC AUTO: 87.2 FL (ref 80–98)
PLATELET # BLD AUTO: 152 10*3/MM3 (ref 150–450)
PMV BLD AUTO: 9.2 FL (ref 8–12)
POTASSIUM BLD-SCNC: 3.9 MMOL/L (ref 3.5–5.1)
RBC # BLD AUTO: 4.07 10*6/MM3 (ref 4.37–5.74)
SODIUM BLD-SCNC: 138 MMOL/L (ref 137–145)
WBC NRBC COR # BLD: 6.55 10*3/MM3 (ref 3.2–9.8)
WHOLE BLOOD HOLD SPECIMEN: NORMAL

## 2017-04-19 PROCEDURE — 83605 ASSAY OF LACTIC ACID: CPT | Performed by: NURSE PRACTITIONER

## 2017-04-19 PROCEDURE — 25010000002 MORPHINE SULFATE (PF) 2 MG/ML SOLUTION: Performed by: HOSPITALIST

## 2017-04-19 PROCEDURE — 94799 UNLISTED PULMONARY SVC/PX: CPT

## 2017-04-19 PROCEDURE — 94760 N-INVAS EAR/PLS OXIMETRY 1: CPT

## 2017-04-19 PROCEDURE — G8979 MOBILITY GOAL STATUS: HCPCS

## 2017-04-19 PROCEDURE — 25010000002 CEFTRIAXONE: Performed by: NURSE PRACTITIONER

## 2017-04-19 PROCEDURE — G8978 MOBILITY CURRENT STATUS: HCPCS

## 2017-04-19 PROCEDURE — 87086 URINE CULTURE/COLONY COUNT: CPT | Performed by: NURSE PRACTITIONER

## 2017-04-19 PROCEDURE — 97530 THERAPEUTIC ACTIVITIES: CPT

## 2017-04-19 PROCEDURE — 25010000002 AZITHROMYCIN: Performed by: NURSE PRACTITIONER

## 2017-04-19 PROCEDURE — 97161 PT EVAL LOW COMPLEX 20 MIN: CPT

## 2017-04-19 PROCEDURE — 80048 BASIC METABOLIC PNL TOTAL CA: CPT | Performed by: NURSE PRACTITIONER

## 2017-04-19 PROCEDURE — 85027 COMPLETE CBC AUTOMATED: CPT | Performed by: NURSE PRACTITIONER

## 2017-04-19 RX ADMIN — CEFTRIAXONE 1 G: 1 INJECTION, POWDER, FOR SOLUTION INTRAMUSCULAR; INTRAVENOUS at 17:20

## 2017-04-19 RX ADMIN — IPRATROPIUM BROMIDE AND ALBUTEROL SULFATE 3 ML: 2.5; .5 SOLUTION RESPIRATORY (INHALATION) at 02:05

## 2017-04-19 RX ADMIN — MORPHINE SULFATE 1 MG: 2 INJECTION, SOLUTION INTRAMUSCULAR; INTRAVENOUS at 10:00

## 2017-04-19 RX ADMIN — IPRATROPIUM BROMIDE AND ALBUTEROL SULFATE 3 ML: 2.5; .5 SOLUTION RESPIRATORY (INHALATION) at 07:50

## 2017-04-19 RX ADMIN — DUTASTERIDE 0.5 MG: 0.5 CAPSULE ORAL at 09:56

## 2017-04-19 RX ADMIN — INDAPAMIDE 2.5 MG: 2.5 TABLET, FILM COATED ORAL at 09:56

## 2017-04-19 RX ADMIN — SODIUM CHLORIDE 75 ML/HR: 900 INJECTION, SOLUTION INTRAVENOUS at 13:54

## 2017-04-19 RX ADMIN — IPRATROPIUM BROMIDE AND ALBUTEROL SULFATE 3 ML: 2.5; .5 SOLUTION RESPIRATORY (INHALATION) at 19:15

## 2017-04-19 RX ADMIN — POTASSIUM CHLORIDE 20 MEQ: 750 CAPSULE, EXTENDED RELEASE ORAL at 09:56

## 2017-04-19 RX ADMIN — POLYETHYLENE GLYCOL 3350 17 G: 17 POWDER, FOR SOLUTION ORAL at 09:57

## 2017-04-19 RX ADMIN — IPRATROPIUM BROMIDE AND ALBUTEROL SULFATE 3 ML: 2.5; .5 SOLUTION RESPIRATORY (INHALATION) at 11:23

## 2017-04-19 RX ADMIN — MORPHINE SULFATE 1 MG: 2 INJECTION, SOLUTION INTRAMUSCULAR; INTRAVENOUS at 13:54

## 2017-04-19 RX ADMIN — PANTOPRAZOLE SODIUM 40 MG: 40 TABLET, DELAYED RELEASE ORAL at 06:35

## 2017-04-19 RX ADMIN — ACETAMINOPHEN 650 MG: 325 TABLET ORAL at 21:06

## 2017-04-19 RX ADMIN — DOCUSATE SODIUM 100 MG: 100 CAPSULE, LIQUID FILLED ORAL at 17:20

## 2017-04-19 RX ADMIN — DOCUSATE SODIUM 100 MG: 100 CAPSULE, LIQUID FILLED ORAL at 09:57

## 2017-04-19 RX ADMIN — AZITHROMYCIN 500 MG: 500 INJECTION, POWDER, LYOPHILIZED, FOR SOLUTION INTRAVENOUS at 18:50

## 2017-04-19 RX ADMIN — METOPROLOL SUCCINATE 50 MG: 50 TABLET, EXTENDED RELEASE ORAL at 09:57

## 2017-04-19 NOTE — PLAN OF CARE
Problem: Patient Care Overview (Adult)  Goal: Plan of Care Review  Outcome: Ongoing (interventions implemented as appropriate)    04/19/17 0437   Coping/Psychosocial Response Interventions   Plan Of Care Reviewed With patient   Patient Care Overview   Progress no change   Outcome Evaluation   Outcome Summary/Follow up Plan Pt rested well; no changes this shift       Goal: Adult Individualization and Mutuality  Outcome: Ongoing (interventions implemented as appropriate)  Goal: Discharge Needs Assessment  Outcome: Ongoing (interventions implemented as appropriate)    Problem: Pneumonia (Adult)  Goal: Signs and Symptoms of Listed Potential Problems Will be Absent or Manageable (Pneumonia)  Outcome: Ongoing (interventions implemented as appropriate)

## 2017-04-19 NOTE — PLAN OF CARE
Problem: Patient Care Overview (Adult)  Goal: Plan of Care Review  Outcome: Ongoing (interventions implemented as appropriate)    04/19/17 1600   Coping/Psychosocial Response Interventions   Plan Of Care Reviewed With patient   Patient Care Overview   Progress no change   Outcome Evaluation   Outcome Summary/Follow up Plan vitals are stable. pt c/o pain in neck       Goal: Adult Individualization and Mutuality  Outcome: Ongoing (interventions implemented as appropriate)    Problem: Pneumonia (Adult)  Goal: Signs and Symptoms of Listed Potential Problems Will be Absent or Manageable (Pneumonia)  Outcome: Ongoing (interventions implemented as appropriate)

## 2017-04-19 NOTE — CONSULTS
"Adult Nutrition  Assessment    Patient Name:  Sterling Graves  YOB: 1930  MRN: [id number]  Admit Date:  4/18/2017    Assessment Date:  4/19/2017          Reason for Assessment       04/19/17 1042    Reason for Assessment    Reason For Assessment/Visit admission assessment    Identified At Risk By Screening Criteria MST SCORE 2+    Factors Affecting Nutrition Factors    Reported GI Symptoms N & V                Nutrition/Diet History       04/19/17 1043    Nutrition/Diet History    Typical Food/Fluid Intake Pt and family report pt was eating well until the last 2 weeks that he's been ill and having n/v. Wt down 6#.               Labs/Tests/Procedures/Meds       04/19/17 1043    Labs/Tests/Procedures/Meds    Labs/Tests Review Reviewed    Medication Review Reviewed, pertinent              Estimated/Assessed Needs       04/19/17 1044    Calculation Measurements    Weight Used For Calculations 68 kg (150 lb)    Height Used for Calculations 1.702 m (5' 7\")    Estimated/Assessed Energy Needs    Energy Need Method Kcal/kg    kcal/kg 25    25 Kcal/Kg (kcal) 1701    Estimated/Assessed Protein Needs    Weight Used for Protein Calculation 68 kg (150 lb)    Protein (gm/kg) 0.8    0.8 Gm Protein (gm) 54.43    Estimated Protein Range 55-70            Nutrition Prescription Ordered       04/19/17 1044    Nutrition Prescription PO    Current PO Diet Regular    Common Modifiers Cardiac;Consistent Carbohydrate              Comments:  Pt reports eating well up until the last 2 weeks that he's been ill and lost 6# d/t n/v. Pt agreeable to ensure to optimize dyllan/pro intake. RD will monitor.         Electronically signed by:  Carmella Devi RD  04/19/17 10:47 AM  "

## 2017-04-19 NOTE — PROGRESS NOTES
"    [facility] Medicine Services  INPATIENT PROGRESS NOTE    Length of Stay: 1  Date of Admission: 4/18/2017  Primary Care Physician: Augustin Jones MD    Subjective   Chief Complaint: weakness/back pain  HPI:  86-year-old -American male who presented to the emergency department after being sent from his primary care provider's office. Primary care provider reported that the patient was having abdominal pain, constipation, nausea and vomiting and there was concern for acute abdomen. However workup in the emergency department including CT of the abdomen and pelvis was unremarkable for any acute abdomen, however the patient was noted to have right lower lobe pneumonia. Upon evaluation in the emergency department the patient makes no mention of abdominal pain. He reports that he has had nausea and vomiting for 4 days prior to admission and approximately 6-8 pound weight loss. His grandson reports that he has been having chills and \"rattling when he breathes.\"     Review of Systems   Constitutional: Negative for chills and fever.   Respiratory: Negative for cough, chest tightness, shortness of breath and wheezing.    Cardiovascular: Negative for chest pain, palpitations and leg swelling.   Gastrointestinal: Positive for nausea. Negative for abdominal distention, abdominal pain, diarrhea and vomiting.   Genitourinary: Negative for decreased urine volume, difficulty urinating and frequency.   Musculoskeletal: Positive for back pain.   Neurological: Positive for weakness. Negative for dizziness.   Psychiatric/Behavioral: Negative for confusion.        All pertinent negatives and positives are as above. All other systems have been reviewed and are negative unless otherwise stated.     Objective    Temp:  [97.2 °F (36.2 °C)-98.9 °F (37.2 °C)] 98.7 °F (37.1 °C)  Heart Rate:  [66-92] 92  Resp:  [16-20] 20  BP: (110-188)/(56-89) 125/56    Physical Exam   Constitutional: He is " oriented to person, place, and time. He appears well-developed.   HENT:   Head: Normocephalic.   Eyes: Conjunctivae are normal.   Neck: Neck supple.   Cardiovascular: Normal rate, normal heart sounds and intact distal pulses.    Pulmonary/Chest: Effort normal. No respiratory distress. He has decreased breath sounds in the right lower field and the left lower field. He has no wheezes.   Abdominal: Soft. Bowel sounds are normal. He exhibits no distension. There is no tenderness.   Neurological: He is alert and oriented to person, place, and time.   Skin: Skin is warm and dry.   Psychiatric: He has a normal mood and affect. His behavior is normal.   Vitals reviewed.          Results Review:  I have reviewed the labs, radiology results, and diagnostic studies.    Laboratory Data:     Results from last 7 days  Lab Units 04/19/17  0708 04/18/17  1353   SODIUM mmol/L 138 136*   POTASSIUM mmol/L 3.9 4.1   CHLORIDE mmol/L 103 96   TOTAL CO2 mmol/L 25.0 27.0   BUN mg/dL 17 28*   CREATININE mg/dL 0.78 0.98   GLUCOSE mg/dL 113* 106*   CALCIUM mg/dL 8.8 9.2   BILIRUBIN mg/dL  --  0.9   ALK PHOS U/L  --  77   ALT (SGPT) U/L  --  16*   AST (SGOT) U/L  --  44   ANION GAP mmol/L 10.0 13.0     Estimated Creatinine Clearance: 61.7 mL/min (by C-G formula based on Cr of 0.78).            Results from last 7 days  Lab Units 04/19/17  0708 04/18/17  1353   WBC 10*3/mm3 6.55 8.87   HEMOGLOBIN g/dL 12.1* 12.4*   HEMATOCRIT % 35.5* 36.2*   PLATELETS 10*3/mm3 152 157           Culture Data:   Blood Culture   Date Value Ref Range Status   04/18/2017 No growth at less than 24 hours  Preliminary   04/18/2017 No growth at less than 24 hours  Preliminary     No results found for: URINECX  No results found for: RESPCX  No results found for: WOUNDCX  No results found for: STOOLCX  No components found for: BODYFLD    Radiology Data:   Imaging Results (last 24 hours)     Procedure Component Value Units Date/Time    CT Abdomen Pelvis With Contrast  [[id number]] Collected:  04/18/17 1640     Updated:  04/18/17 1655    Narrative:         EXAMINATION:  Computed Tomography           REGION:    Abdomen / Pelvis                     INDICATION:    Generalized abdominal pain      HISTORY:    Prostate carcinoma      LINDA. IMAGING:    none            TECHNIQUE:      - reconstructions:    axial, coronal, sagittal         - contrast:      oral:  Yes ;   intravenous:  Yes -Isovue 300,  80 mL     This exam was performed according to the departmental  dose-optimization program which includes automated exposure  control, adjustment of the mA and/or kV according to patient size  and/or use of iterative reconstruction technique.           COMMENTS:            - - - CT ABDOMEN - - -          THORAX (INFERIOR):      - LUNG BASES:  clear      - PLEURA:    no fluid or mass      - HEART:    normal size, no pericardial fluid     - MISC:      n/a          ABDOMEN:     - LIVER:    normal size / contour, no ductal dilatation , single  focal hypoattenuating lesion in the left lobe measuring 0.8 cm,  nonenhancing, presumably of a benign etiology.   - GB:      grossly negative    - CBD:      grossly negative   - SPLEEN:    normal size and contour   - PANCREAS:    normal in size, contour, no focal mass    - VISCERA:    normal caliber, no wall thickening     - MESENTERY:  no mesenteric mass   - CAVITY:    no free abdominal fluid, no free intraperitoneal  air   - BODY WALL:  wnl   - OSSEOUS:  Degenerative changes associated with the inferior  lumbar spine with moderate to high-grade acquired spinal stenosis  at the L4/5 and L3/4 levels with high-grade spinal neural  foraminal narrowing on the right at these levels.   - MISC:                                      RETROPERITONEUM:   - KIDNEYS:    normal size / contour, no collecting system  dilation; multiple nonenhancing hypoattenuating lesions, likely  cysts. No evidence of an enhancing mass.   - URETERS:    normal course, caliber   - ADRENALS:     normal size, contour   - MISC:      no sig retroperitoneal adenopathy or mass   - VASCULAR:    aorta / iliacs: wnl for age     - - - CT PELVIS - - -      - VISCERA:    normal caliber small/large bowel, no focal  thickening/mass    - MESENTERY:  no mass   - VASCULAR:    wnl for age   - CAVITY:    no free fluid / air   - BLADDER:    unremarkable   - OSSEOUS:    wnl    - MISC:   - PROSTATE:  1.3 cm bulge in the right peripheral zone with mild  enhancement, suspicious for the presence of a neoplastic process.  Suggest correlation with PSA. (Axial 78/103)     .       Impression:        CONCLUSION:  1. No evidence of an acute intra-abdominal/pelvic process.  2. Right lower lobe airspace disease likely representing  pneumonia.  3. Multilevel degenerative changes in the lumbar spine as  described above, suggest correlation with MRI.  4. Nodule in the prostate gland, suspicious for the presence of a  neoplastic process, and correlation with PSA is recommended.  Biopsy suggested.      Electronically signed by:  GLENIS Gutierrez MD  4/18/2017 4:54  PM CDT Workstation: [employer]          I have reviewed the patient current medications.     Assessment/Plan     Assessment/Plan:    1.  Right lower lobe pneumonia: Rocephin, azithromycin, nebulizer treatments, oxygen therapy, incentive spirometry.  #2. Acute cystitis: Cultures pending, Rocephin  #3. Mild dehydration: resolved.  #4 hypertension: Continue patient's home medications.  #5 history of peptic ulcer disease  #6 history BPH  #7 prostate nodules noted on CT scan: family reports that this has been followed outpatient by Dr. Greene.    Discharge Planning: I expect patient to be discharged to home in 2 days.    Magda Chavez, LEONIE   04/19/17   9:57 AM

## 2017-04-20 ENCOUNTER — APPOINTMENT (OUTPATIENT)
Dept: GENERAL RADIOLOGY | Facility: HOSPITAL | Age: 82
End: 2017-04-20

## 2017-04-20 PROCEDURE — 94760 N-INVAS EAR/PLS OXIMETRY 1: CPT

## 2017-04-20 PROCEDURE — 97166 OT EVAL MOD COMPLEX 45 MIN: CPT

## 2017-04-20 PROCEDURE — G8987 SELF CARE CURRENT STATUS: HCPCS

## 2017-04-20 PROCEDURE — 25010000002 CEFTRIAXONE: Performed by: NURSE PRACTITIONER

## 2017-04-20 PROCEDURE — 25010000002 AZITHROMYCIN: Performed by: NURSE PRACTITIONER

## 2017-04-20 PROCEDURE — 71020 HC CHEST PA AND LATERAL: CPT

## 2017-04-20 PROCEDURE — 94799 UNLISTED PULMONARY SVC/PX: CPT

## 2017-04-20 PROCEDURE — 72040 X-RAY EXAM NECK SPINE 2-3 VW: CPT

## 2017-04-20 PROCEDURE — G8988 SELF CARE GOAL STATUS: HCPCS

## 2017-04-20 PROCEDURE — 25010000002 MORPHINE SULFATE (PF) 2 MG/ML SOLUTION: Performed by: HOSPITALIST

## 2017-04-20 RX ADMIN — CEFTRIAXONE 1 G: 1 INJECTION, POWDER, FOR SOLUTION INTRAMUSCULAR; INTRAVENOUS at 16:38

## 2017-04-20 RX ADMIN — DUTASTERIDE 0.5 MG: 0.5 CAPSULE ORAL at 09:09

## 2017-04-20 RX ADMIN — PANTOPRAZOLE SODIUM 40 MG: 40 TABLET, DELAYED RELEASE ORAL at 09:11

## 2017-04-20 RX ADMIN — DOCUSATE SODIUM 100 MG: 100 CAPSULE, LIQUID FILLED ORAL at 09:09

## 2017-04-20 RX ADMIN — IPRATROPIUM BROMIDE AND ALBUTEROL SULFATE 3 ML: 2.5; .5 SOLUTION RESPIRATORY (INHALATION) at 07:13

## 2017-04-20 RX ADMIN — INDAPAMIDE 2.5 MG: 2.5 TABLET, FILM COATED ORAL at 09:09

## 2017-04-20 RX ADMIN — SODIUM CHLORIDE 75 ML/HR: 900 INJECTION, SOLUTION INTRAVENOUS at 02:51

## 2017-04-20 RX ADMIN — DOCUSATE SODIUM 100 MG: 100 CAPSULE, LIQUID FILLED ORAL at 18:36

## 2017-04-20 RX ADMIN — IPRATROPIUM BROMIDE AND ALBUTEROL SULFATE 3 ML: 2.5; .5 SOLUTION RESPIRATORY (INHALATION) at 15:20

## 2017-04-20 RX ADMIN — METOPROLOL SUCCINATE 50 MG: 50 TABLET, EXTENDED RELEASE ORAL at 09:09

## 2017-04-20 RX ADMIN — IPRATROPIUM BROMIDE AND ALBUTEROL SULFATE 3 ML: 2.5; .5 SOLUTION RESPIRATORY (INHALATION) at 20:03

## 2017-04-20 RX ADMIN — POLYETHYLENE GLYCOL 3350 17 G: 17 POWDER, FOR SOLUTION ORAL at 09:09

## 2017-04-20 RX ADMIN — POTASSIUM CHLORIDE 20 MEQ: 750 CAPSULE, EXTENDED RELEASE ORAL at 09:09

## 2017-04-20 RX ADMIN — MORPHINE SULFATE 1 MG: 2 INJECTION, SOLUTION INTRAMUSCULAR; INTRAVENOUS at 12:16

## 2017-04-20 RX ADMIN — AZITHROMYCIN 500 MG: 500 INJECTION, POWDER, LYOPHILIZED, FOR SOLUTION INTRAVENOUS at 18:36

## 2017-04-20 RX ADMIN — SODIUM CHLORIDE 75 ML/HR: 900 INJECTION, SOLUTION INTRAVENOUS at 16:38

## 2017-04-20 NOTE — PLAN OF CARE
Problem: Patient Care Overview (Adult)  Goal: Plan of Care Review  Outcome: Ongoing (interventions implemented as appropriate)    04/20/17 1322   Coping/Psychosocial Response Interventions   Plan Of Care Reviewed With patient;family   Outcome Evaluation   Outcome Summary/Follow up Plan Pt would benefit from OT services to increase independence with ADLs and functional mobility/transfers.         Problem: Inpatient Occupational Therapy  Goal: Transfer Training Goal 1 LTG- OT  Outcome: Ongoing (interventions implemented as appropriate)    04/20/17 1322   Transfer Training OT LTG   Transfer Training OT LTG, Date Established 04/20/17   Transfer Training OT LTG, Time to Achieve by discharge   Transfer Training OT LTG, Activity Type all transfers   Transfer Training OT LTG, [place] Level supervision required   Transfer Training OT LTG, Assist Device walker, rolling;cane, straight       Goal: Static Standing Balance Goal OT- STG  Outcome: Ongoing (interventions implemented as appropriate)    04/20/17 1322   Static Standing Balance OT STG   Static Standing Balance OT STG, Date Established 04/20/17   Static Standing Balance OT STG, Time to Achieve 5 - 7 days   Static Standing Balance OT STG, [place] Level supervision  (5 minutes with 0-1 rest break.)   Static Standing Balance OT STG, Assist Device assistive device  (Rolling walker or cane.)       Goal: Patient Education Goal STG- OT  Outcome: Ongoing (interventions implemented as appropriate)    04/20/17 1322   Patient Education OT STG   Patient Education OT STG, Date Established 04/20/17   Patient Education OT STG, Time to Achieve 5 days   Patient Education OT STG, Education Type home safety;work simplification;energy conservation   Patient Education OT STG, Education Understanding demonstrates adequately;verbalizes understanding       Goal: ADL Goal LTG- OT  Outcome: Ongoing (interventions implemented as appropriate)    04/20/17 1322   ADL OT LTG   ADL OT LTG,  Date Established 04/20/17   ADL OT LTG, Time to Achieve by discharge   ADL OT LTG, Activity Type ADL skills  (Sponge bath and dress.)   ADL OT LTG, [place] Level standby assist;assistive device  (Strenght cane or rolling walker.)

## 2017-04-20 NOTE — SIGNIFICANT NOTE
04/20/17 1637   Rehab Treatment   Discipline physical therapist   Treatment Not Performed patient/family declined treatment  (3 visits to see pt;1st pending neck film results;  family report napping this pm and ask to check tomorrow; )   Recommendation   PT - Next Appointment 04/21/17

## 2017-04-20 NOTE — PROGRESS NOTES
Acute Care - Occupational Therapy Initial Evaluation  [facility]     Patient Name: Sterling Graves  : 1930  MRN: [id number]  Today's Date: 2017  Onset of Illness/Injury or Date of Surgery Date: 17  Date of Referral to OT: 17  Referring Physician: LEONIE Chavez    Admit Date: 2017       ICD-10-CM ICD-9-CM   1. Pneumonia of right lower lobe due to infectious organism J18.9 483.8   2. Dehydration E86.0 276.51   3. Abnormality of gait and mobility R26.9 781.2   4. Impaired mobility and activities of daily living Z74.09 799.89     Patient Active Problem List   Diagnosis   • Essential hypertension   • Eczema   • Benign prostatic hyperplasia   • Vertigo   • Post-traumatic osteoarthritis of left knee   • Post-traumatic osteoarthritis of left hip   • Gastroesophageal reflux disease without esophagitis   • Vitamin D deficiency   • Pneumonia   • Dehydration     Past Medical History:   Diagnosis Date   • Allergic rhinitis    • Benign prostatic hyperplasia    • Cataract    • Chalazion of left lower eyelid    • Corneal abrasion, left    • Degenerative joint disease involving multiple joints    • Dizziness    • Eczema    • Essential hypertension    • Hearing loss    • History of pneumococcal vaccination    • Inguinal hernia    • Long term use of drug    • Male erectile disorder    • Onychomycosis    • Peptic ulcer    • Pseudophakia    • Rotator cuff syndrome     right shoulder   • Upper respiratory infection    • Vertigo    • Vitreous detachment     posterior with small hemorrhage        Past Surgical History:   Procedure Laterality Date   • ANKLE SURGERY  1960   • CATARACT EXTRACTION  2002   • ENDOSCOPY  2011    w/tube   • FOOT SURGERY  1997   • GTUBE INSERTION  2011   • INGUINAL HERNIA REPAIR  1999   • KNEE SURGERY  1991   • KNEE SURGERY  1990   • SHOULDER SURGERY  10/12/2004   • SKIN BIOPSY  10/23/2002   • TOTAL KNEE ARTHROPLASTY   09/23/1991          OT ASSESSMENT FLOWSHEET (last 72 hours)      OT Evaluation       04/20/17 1038 04/19/17 1450 04/18/17 2200          Rehab Evaluation    Document Type evaluation  -RB evaluation  -GB       Subjective Information agree to therapy;complains of;pain  -RB no complaints  -GB       Patient Effort, Rehab Treatment adequate  -RB good  -GB       Symptoms Noted During/After Treatment increased pain  -RB fatigue  -GB       General Information    Patient Profile Review yes  -RB yes  -GB       Onset of Illness/Injury or Date of Surgery Date 04/18/17  -RB 04/18/17  -GB       Referring Physician LEONIE Chavez  -RB        General Observations Supine in bed with grandaughter at bedside.  -RB        Pertinent History Of Current Problem N/V prior to admit.  Dx with pneumonia and UTI.  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  -GB       Equipment Currently Used at Home cane, straight  -RB cane, straight  -GB none  -ND      Plans/Goals Discussed With patient and family  -RB patient and family  -GB       Risks Reviewed patient and family:  -RB patient and family:   fatigue  -GB       Benefits Reviewed  increase strength;patient and family:  -GB       Living Environment    Lives With alone  -RB alone  -GB alone  -ND      Living Arrangements house  -RB house  -GB house  -ND      Home Accessibility bed and bath are not on the first floor  -RB bed and bath are not on the first floor  -GB no concerns  -ND      Stair Railings at Home inside, present at both sides  -RB --   granddaughter reports bedroom upstairs in house;   -GB none  -ND      Type of Financial/Environmental Concern   none  -ND      Transportation Available car  -RB car  -GB car  -ND       Clinical Impression    Date of Referral to OT 04/19/17  -RB        OT Diagnosis Impaired mobility and ADLs.  -RB        Functional Level At Time Of Evaluation Assist for mobility and ADLs.  -RB        Impairments Found (describe specific impairments) gait, locomotion, and balance  -RB        Criteria for Skilled Therapeutic Interventions Met yes;treatment indicated  -RB        Rehab Potential good, to achieve stated therapy goals  -RB        Therapy Frequency other (see comments)   3-14x/wk  -RB        Anticipated Equipment Needs At Discharge front wheeled walker  -RB        Anticipated Discharge Disposition home with home health  -RB        Functional Level Prior    Ambulation 0-->independent  -RB  0-->independent  -ND      Transferring 0-->independent  -RB  0-->independent  -ND      Toileting 0-->independent  -RB  0-->independent  -ND      Bathing 0-->independent  -RB  0-->independent  -ND      Dressing 0-->independent  -RB  0-->independent  -ND      Eating 0-->independent  -RB  0-->independent  -ND      Communication 0-->understands/communicates without difficulty  -RB  0-->understands/communicates without difficulty  -ND      Swallowing 0-->swallows foods/liquids without difficulty  -RB  0-->swallows foods/liquids without difficulty  -ND      Prior Functional Level Comment   able to perform ADLs  -ND      Vital Signs    Pre Systolic BP Rehab 167  -  -GB       Pre Treatment Diastolic BP 73  -RB 62  -GB       Intra Systolic BP Rehab  156  -GB       Intra Treatment Diastolic BP  84  -GB       Post Systolic BP Rehab  165  -GB       Post Treatment Diastolic BP  75  -GB       Pretreatment Heart Rate (beats/min) 67  -RB 80  -GB       Intratreatment Heart Rate (beats/min)  82  -GB       Posttreatment Heart Rate (beats/min) 70  -RB 74  -GB       Pre SpO2 (%) 94  -RB 92  -GB       O2 Delivery Pre Treatment room air  -RB room air  -GB       Intra SpO2 (%)  89  -GB       O2 Delivery Intra Treatment  room air  -GB        Post SpO2 (%) 92  -RB 94  -GB       O2 Delivery Post Treatment room air  -RB room air  -GB       Pre Patient Position Supine  -RB Supine  -GB       Intra Patient Position Standing  -RB Sitting  -GB       Post Patient Position Supine  -RB Supine  -GB       Pain Assessment    Pain Assessment 0-10  -RB 0-10  -GB       Pain Score 2  -RB        Post Pain Score 5  -RB        Pain Type Acute pain  -RB        Pain Location Neck  -RB Neck   pt c/o neck pain w/ initiaition of trunk fwd flex to get up   -GB       Response to Interventions  pt c/o neck pain w/ initiation of sup-sit; CNA confirms prior c/o; no c/o once up or on return to supine; pt struggled to turn to get up initially needing cuing on bed mobility & full assist sup to sit;   -GB       Result of Injury  --   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  -RB        Cognitive Assessment/Intervention    Current Cognitive/Communication Assessment functional  -RB functional  -GB       Orientation Status oriented x 4  -RB oriented to;person;place;situation  -GB       Follows Commands/Answers Questions 100% of the time  -% of the time  -GB       Personal Safety Interventions gait belt;nonskid shoes/slippers when out of bed;supervised activity  -RB        ROM (Range of Motion)    General ROM no range of motion deficits identified  -RB no range of motion deficits identified;head/neck/trunk range of motion deficits identified   except end range shoulder flx flower but ~100 deg flx flower;  -GB       General ROM Detail B UEs.  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  -GB       MMT (Manual Muscle Testing)    General MMT Assessment no strength deficits identified  -RB no strength deficits identified   heather 4/5 resistance w/out  c/o in range  -GB       General MMT Assessment Detail 4+/5 for B UEs  -RB        Muscle Tone Assessment    Muscle Tone Assessment  neck   palpation of neck showed some muscle tight,no point tender  -GB       Bed Mobility, Assessment/Treatment    Bed Mobility, Roll Left, [place]  moderate assist (50% patient effort);maximum assist (25% patient effort)  -GB       Bed Mobility, Roll Right, [place]  moderate assist (50% patient effort);maximum assist (25% patient effort)  -GB       Bed Mob, Supine to Sit, [place] moderate assist (50% patient effort)  -RB moderate assist (50% patient effort);maximum assist (25% patient effort);1 person + 1 person to manage equipment  -GB       Bed Mob, Sit to Supine, [place] minimum assist (75% patient effort)  -RB moderate assist (50% patient effort)  -GB       Transfer Assessment/Treatment    Transfers, Sit-Stand [place] contact guard assist;minimum assist (75% patient effort)  -RB        Transfers, Stand-Sit [place] contact guard assist;minimum assist (75% patient effort)  -RB        Transfers, Sit-Stand-Sit, Assist Device straight cane  -RB        Toilet Transfer, [place]  minimum assist (75% patient effort);contact guard assist;1 person + 1 person to manage equipment  -GB       Toilet Transfer, Assistive Device  straight cane  -GB       Transfer, Safety Issues step length decreased;balance decreased during turns  -RB        Functional Mobility    Functional Mobility- Ind. Level minimum assist (75% patient effort)  -RB        Functional Mobility- Device straight cane  -RB        Functional Mobility-Distance (Feet) 16  -RB        General Therapy Interventions    Planned Therapy Interventions activity intolerance;ADL retraining;balance training;transfer training  -RB        Activity Intolerance poor.  -RB        Positioning and Restraints    Pre-Treatment Position in bed  -RB in bed  -GB       Post Treatment Position bed  -RB bed  -GB       In Bed  supine;with other staff  -RB supine;call light within reach;encouraged to call for assist;exit alarm on;with family/caregiver;side rails up x2;R heel elevated;L heel elevated  -GB         User Key  (r) = Recorded By, (t) = Taken By, (c) = Cosigned By    Initials Name Effective Dates    SOO Hu OT 06/15/16 -     GB Estela Doe PT 10/17/16 -     VIKTORIYA Messina RN 10/17/16 -            Occupational Therapy Education     Title: PT OT SLP Therapies (Active)     Topic: Occupational Therapy (Active)     Point: Precautions (Active)    Description: Instruct learner(s) on prescribed precautions during self-care and functional transfers.    Learning Progress Summary    Learner Readiness Method Response Comment Documented by Status   Patient Acceptance E NR Stressed use on non skid socks and gait belt when out of bed.  04/20/17 1320 Active   Family Acceptance E NR Stressed use on non skid socks and gait belt when out of bed.  04/20/17 1320 Active                      User Key     Initials Effective Dates Name Provider Type Discipline     06/15/16 -  Jn Hu OT Occupational Therapist OT                  OT Recommendation and Plan  Anticipated Equipment Needs At Discharge: front wheeled walker  Anticipated Discharge Disposition: home with home health  Planned Therapy Interventions: activity intolerance, ADL retraining, balance training, transfer training  Therapy Frequency: other (see comments) (3-14x/wk)  Plan of Care Review  Plan Of Care Reviewed With: patient, family  Outcome Summary/Follow up Plan: Pt would benefit from OT services to increase independence with ADLs and functional mobility/transfers.          OT Goals       04/20/17 1322          Transfer Training OT LTG    Transfer Training OT LTG, Date Established 04/20/17  -RB      Transfer Training OT LTG, Time to Achieve by discharge  -RB      Transfer Training OT LTG, Activity Type all transfers  -RB      Transfer Training OT  LTG, [place] Level supervision required  -RB      Transfer Training OT LTG, Assist Device walker, rolling;cane, straight  -RB      Static Standing Balance OT STG    Static Standing Balance OT STG, Date Established 04/20/17  -RB      Static Standing Balance OT STG, Time to Achieve 5 - 7 days  -RB      Static Standing Balance OT STG, [place] Level supervision   5 minutes with 0-1 rest break.  -RB      Static Standing Balance OT STG, Assist Device assistive device   Rolling walker or cane.  -RB      Patient Education OT STG    Patient Education OT STG, Date Established 04/20/17  -RB      Patient Education OT STG, Time to Achieve 5 days  -RB      Patient Education OT STG, Education Type home safety;work simplification;energy conservation  -RB      Patient Education OT STG, Education Understanding demonstrates adequately;verbalizes understanding  -RB      ADL OT LTG    ADL OT LTG, Date Established 04/20/17  -RB      ADL OT LTG, Time to Achieve by discharge  -RB      ADL OT LTG, Activity Type ADL skills   Sponge bath and dress.  -RB      ADL OT LTG, [place] Level standby assist;assistive device   Strenght cane or rolling walker.  -RB        User Key  (r) = Recorded By, (t) = Taken By, (c) = Cosigned By    Initials Name Provider Type    RB Jn Hu, OT Occupational Therapist                Outcome Measures       04/20/17 1038 04/19/17 1900       How much help from another person do you currently need...    Turning from your back to your side while in flat bed without using bedrails?  2  -GB     Moving from lying on back to sitting on the side of a flat bed without bedrails?  2  -GB     Moving to and from a bed to a chair (including a wheelchair)?  2  -GB     Standing up from a chair using your arms (e.g., wheelchair, bedside chair)?  2  -GB     Climbing 3-5 steps with a railing?  2  -GB     To walk in hospital room?  3  -GB     AM-PAC 6 Clicks Score  13  -GB     How much help from another is currently  needed...    Putting on and taking off regular lower body clothing? 2  -RB      Bathing (including washing, rinsing, and drying) 2  -RB      Toileting (which includes using toilet bed pan or urinal) 2  -RB      Putting on and taking off regular upper body clothing 3  -RB      Taking care of personal grooming (such as brushing teeth) 3  -RB      Eating meals 4  -RB      Score 16  -RB      Functional Assessment    Outcome Measure Options AM-PAC 6 Clicks Daily Activity (OT)  -RB AM-PAC 6 Clicks Basic Mobility (PT)  -GB       User Key  (r) = Recorded By, (t) = Taken By, (c) = Cosigned By    Initials Name Provider Type    RB Jn Hu OT Occupational Therapist    CELESTINA Doe, PT Physical Therapist          Time Calculation:   OT Start Time: 1038  OT Stop Time: 1103  OT Time Calculation (min): 25 min    Therapy Charges for Today     Code Description Service Date Service Provider Modifiers Qty    [id number]  OT SELFCARE CURRENT 4/20/2017 TAMIKO Paige, CK 1    [id number]  OT SELFCARE PROJECTED 4/20/2017 TAMIKO Paige, CJ 1    [id number]  OT EVAL MOD COMPLEXITY 2 4/20/2017 Jn Hu OT GO 1          OT G-codes  OT Professional Judgement Used?: Yes  OT Functional Scales Options: AM-PAC 6 Clicks Daily Activity (OT)  Score: 16  Functional Limitation: Self care  Self Care Current Status (): At least 40 percent but less than 60 percent impaired, limited or restricted  Self Care Goal Status (): At least 20 percent but less than 40 percent impaired, limited or restricted    Jn Hu OT  4/20/2017

## 2017-04-20 NOTE — PLAN OF CARE
Problem: Patient Care Overview (Adult)  Goal: Plan of Care Review  Outcome: Ongoing (interventions implemented as appropriate)    04/20/17 0429   Coping/Psychosocial Response Interventions   Plan Of Care Reviewed With patient;manda   Patient Care Overview   Progress no change   Outcome Evaluation   Outcome Summary/Follow up Plan Patient is voiding many times throughout night, had small bm.       Goal: Adult Individualization and Mutuality  Outcome: Ongoing (interventions implemented as appropriate)  Goal: Discharge Needs Assessment  Outcome: Ongoing (interventions implemented as appropriate)    Problem: Pneumonia (Adult)  Goal: Signs and Symptoms of Listed Potential Problems Will be Absent or Manageable (Pneumonia)  Outcome: Ongoing (interventions implemented as appropriate)

## 2017-04-20 NOTE — PROGRESS NOTES
[facility] Medicine Services  INPATIENT PROGRESS NOTE     LOS: 2 days   Patient Care Team:  Augustin Jones MD as PCP - General (Family Medicine)    Chief Complaint:  Patient is seen for follow-up today.  In place of right-sided neck pain.  History of fall, headaches, blurry vision or any lateralizing signs.  Patient has history of degenerative arthritis.  He is overall doing better and is less short of breath and does not wear any oxygen.      Subjective     Interval History:     Patient Complaints: As above.    History taken from: Patient and granddaughter.    Review of Systems:    Review of Systems   Constitutional: Negative for chills, diaphoresis, fatigue and fever.   Respiratory: Negative for cough, chest tightness, shortness of breath and wheezing.    Cardiovascular: Negative for chest pain, palpitations and leg swelling.   Gastrointestinal: Negative for abdominal pain, constipation, diarrhea, nausea and vomiting.   Genitourinary: Negative for flank pain, frequency, hematuria and urgency.   Musculoskeletal: Positive for neck pain.   Neurological: Negative for dizziness, syncope, facial asymmetry, speech difficulty, weakness, light-headedness, numbness and headaches.   Psychiatric/Behavioral: Negative for agitation, behavioral problems and confusion.         Objective     Vital Signs  Temp:  [98.7 °F (37.1 °C)-101.1 °F (38.4 °C)] 99.2 °F (37.3 °C)  Heart Rate:  [62-91] 75  Resp:  [18] 18  BP: (124-166)/(58-73) 146/67    Physical Exam:   Physical Exam   Constitutional: He is oriented to person, place, and time. He appears well-developed and well-nourished. No distress.   Neck: Muscular tenderness present. Decreased range of motion present.   Cardiovascular: Normal rate, regular rhythm and normal heart sounds.  Exam reveals no gallop.    No murmur heard.  Pulmonary/Chest: Effort normal and breath sounds normal. He has no wheezes. He has no rales.   Abdominal: Soft.  Bowel sounds are normal. There is no tenderness.   Neurological: He is alert and oriented to person, place, and time.   Skin: Skin is warm and dry. He is not diaphoretic.          Results Review:       Results from last 7 days  Lab Units 04/19/17  0708 04/18/17  1353   SODIUM mmol/L 138 136*   POTASSIUM mmol/L 3.9 4.1   CHLORIDE mmol/L 103 96   TOTAL CO2 mmol/L 25.0 27.0   BUN mg/dL 17 28*   CREATININE mg/dL 0.78 0.98   GLUCOSE mg/dL 113* 106*   CALCIUM mg/dL 8.8 9.2   BILIRUBIN mg/dL  --  0.9   ALK PHOS U/L  --  77   ALT (SGPT) U/L  --  16*   AST (SGOT) U/L  --  44               Results from last 7 days  Lab Units 04/19/17  0708 04/18/17  1353   WBC 10*3/mm3 6.55 8.87   HEMOGLOBIN g/dL 12.1* 12.4*   HEMATOCRIT % 35.5* 36.2*   PLATELETS 10*3/mm3 152 157       Lab Results   Component Value Date    TROPONINI <0.012 04/18/2017       CO2   Date Value Ref Range Status   04/19/2017 25.0 22.0 - 31.0 mmol/L Final              Imaging Results (last 7 days)     Procedure Component Value Units Date/Time    CT Abdomen Pelvis With Contrast [[id number]] Collected:  04/18/17 1640     Updated:  04/18/17 1655    Narrative:         EXAMINATION:  Computed Tomography           REGION:    Abdomen / Pelvis                     INDICATION:    Generalized abdominal pain      HISTORY:    Prostate carcinoma      LINDA. IMAGING:    none            TECHNIQUE:      - reconstructions:    axial, coronal, sagittal         - contrast:      oral:  Yes ;   intravenous:  Yes -Isovue 300,  80 mL     This exam was performed according to the departmental  dose-optimization program which includes automated exposure  control, adjustment of the mA and/or kV according to patient size  and/or use of iterative reconstruction technique.           COMMENTS:            - - - CT ABDOMEN - - -          THORAX (INFERIOR):      - LUNG BASES:  clear      - PLEURA:    no fluid or mass      - HEART:    normal size, no pericardial fluid     - MISC:      n/a          ABDOMEN:      - LIVER:    normal size / contour, no ductal dilatation , single  focal hypoattenuating lesion in the left lobe measuring 0.8 cm,  nonenhancing, presumably of a benign etiology.   - GB:      grossly negative    - CBD:      grossly negative   - SPLEEN:    normal size and contour   - PANCREAS:    normal in size, contour, no focal mass    - VISCERA:    normal caliber, no wall thickening     - MESENTERY:  no mesenteric mass   - CAVITY:    no free abdominal fluid, no free intraperitoneal  air   - BODY WALL:  wnl   - OSSEOUS:  Degenerative changes associated with the inferior  lumbar spine with moderate to high-grade acquired spinal stenosis  at the L4/5 and L3/4 levels with high-grade spinal neural  foraminal narrowing on the right at these levels.   - MISC:                                      RETROPERITONEUM:   - KIDNEYS:    normal size / contour, no collecting system  dilation; multiple nonenhancing hypoattenuating lesions, likely  cysts. No evidence of an enhancing mass.   - URETERS:    normal course, caliber   - ADRENALS:    normal size, contour   - MISC:      no sig retroperitoneal adenopathy or mass   - VASCULAR:    aorta / iliacs: wnl for age     - - - CT PELVIS - - -      - VISCERA:    normal caliber small/large bowel, no focal  thickening/mass    - MESENTERY:  no mass   - VASCULAR:    wnl for age   - CAVITY:    no free fluid / air   - BLADDER:    unremarkable   - OSSEOUS:    wnl    - MISC:   - PROSTATE:  1.3 cm bulge in the right peripheral zone with mild  enhancement, suspicious for the presence of a neoplastic process.  Suggest correlation with PSA. (Axial 78/103)     .       Impression:        CONCLUSION:  1. No evidence of an acute intra-abdominal/pelvic process.  2. Right lower lobe airspace disease likely representing  pneumonia.  3. Multilevel degenerative changes in the lumbar spine as  described above, suggest correlation with MRI.  4. Nodule in the prostate gland, suspicious for the presence of  a  neoplastic process, and correlation with PSA is recommended.  Biopsy suggested.      Electronically signed by:  GLENIS Gutierrez MD  4/18/2017 4:54  PM CDT Workstation: Replenish    XR Spine Cervical 2 or 3 View [[id number]] Updated:  04/20/17 1122    XR Chest PA & Lateral [[id number]] Collected:  04/20/17 1134     Updated:  04/20/17 1136    Narrative:         EXAM:          Radiograph(s), Chest   VIEWS:    PA / Lat ; 2       DATE/TIME:  4/20/2017 11:34 AM CDT               INDICATION:    Pneumonia follow-up           COMPARISON:  CXR: none          FINDINGS:             - lines/tubes:    none     - cardiac:         size within normal limits         - mediastinum: contour within normal limits         - lungs:         Persistent airspace changes in the right base.  The remainder of the lungs are clear.             - pleura:         no evidence of  fluid                  - osseous:         unremarkable for age                  - misc.:         Impression:       CONCLUSION:        1. Persistent focal airspace disease in the right base,  presumably posterior basilar segment. Continued radiographic  follow-up to clearing is recommended.                                   Electronically signed by:  GLENIS Gutierrez MD  4/20/2017 11:35  AM CDT Workstation: Replenish           Blood Culture   Date Value Ref Range Status   04/18/2017 No growth at 24 hours  Preliminary                    Urine Culture   Date Value Ref Range Status   04/19/2017 Culture in progress  Preliminary           Medication Review:   Current Facility-Administered Medications   Medication Dose Route Frequency Provider Last Rate Last Dose   • acetaminophen (TYLENOL) tablet 650 mg  650 mg Oral Q4H PRN Magda Chavez, APRN   650 mg at 04/19/17 2106   • cefTRIAXone (ROCEPHIN) 1 g/100 mL 0.9% NS (MBP)  1 g Intravenous Q24H Magda Chavez, APRN   1 g at 04/19/17 1720    And   • AZITHROMYCIN 500 MG/250 ML 0.9% NS IVPB (vial-mate)  500 mg Intravenous Q24H  Magda Chavez, APRN   500 mg at 04/19/17 1850   • docusate sodium (COLACE) capsule 100 mg  100 mg Oral BID Magda Chavez, APRN   100 mg at 04/20/17 0909   • dutasteride (AVODART) capsule 0.5 mg  0.5 mg Oral Daily Magda Chavez, APRN   0.5 mg at 04/20/17 0909   • indapamide (LOZOL) tablet 2.5 mg  2.5 mg Oral Daily Magda Chavez, APRN   2.5 mg at 04/20/17 0909   • ipratropium-albuterol (DUO-NEB) nebulizer solution 3 mL  3 mL Nebulization Q4H - RT Magda Chavez APRN   3 mL at 04/20/17 0713   • metoprolol succinate XL (TOPROL-XL) 24 hr tablet 50 mg  50 mg Oral Daily Magda Chavez APRN   50 mg at 04/20/17 0909   • Morphine sulfate (PF) injection 1 mg  1 mg Intravenous Q4H PRN Doug Dumont MD   1 mg at 04/19/17 1354   • ondansetron (ZOFRAN) tablet 4 mg  4 mg Oral Q6H PRN LEONIE Vargas        Or   • ondansetron ODT (ZOFRAN-ODT) disintegrating tablet 4 mg  4 mg Oral Q6H PRN LEONIE Vargas        Or   • ondansetron (ZOFRAN) injection 4 mg  4 mg Intravenous Q6H PRN Magda Chavez APRDAYRON       • pantoprazole (PROTONIX) EC tablet 40 mg  40 mg Oral Q AM Magda Chavez APRN   40 mg at 04/20/17 0911   • polyethylene glycol (MIRALAX) powder 17 g  17 g Oral Daily Magda Chavez, APRN   17 g at 04/20/17 0909   • potassium chloride (MICRO-K) CR capsule 20 mEq  20 mEq Oral Daily Magda Chavez, APRN   20 mEq at 04/20/17 0909   • sodium chloride 0.9 % flush 1-10 mL  1-10 mL Intravenous PRN LEONIE Vargas       • sodium chloride 0.9 % flush 10 mL  10 mL Intravenous PRN Jen Pelayo MD   10 mL at 04/18/17 1630   • sodium chloride 0.9 % infusion  75 mL/hr Intravenous Continuous LEONIE Vargas 75 mL/hr at 04/20/17 0251 75 mL/hr at 04/20/17 0251         Assessment/Plan   1.  Pneumonia: Continue current antimicrobial therapy and nebulizers.  Patient has since been off oxygen.  2.  History of BPH: Stable.  3.  History of prostate note: This is being followed by Dr. Greene as outpatient.  We'll however check a  PSA.  4.  Right-sided neck pain: Could be due to sprain/strain.  X-ray of the neck will be done.  Continue pain control.  5.  Anticipate discharge in 1-2 days.      Ahsan Brunner MD  04/20/17      EMR Dragon/Transcription disclaimer:   Much of this encounter note is an electronic transcription/translation of spoken language to printed text. The electronic translation of spoken language may permit erroneous, or at times, nonsensical words or phrases to be inadvertently transcribed; Although I have reviewed the note for such errors, some may still exist.

## 2017-04-20 NOTE — PLAN OF CARE
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    sist       Goal: Discharge Needs Assessment  Outcome: Ongoing (interventions implemented as appropriate)    04/19/17 1851   Discharge Needs Assessment   Concerns To Be Addressed home safety concerns   Concerns Comments may need family/assist at home if d/c w/out fully meeting all mobility and self care goals;   Community Agency Name(S) HH PT to follow prn   Current Health   Outpatient/Agency/Support Group Needs inpatient rehabilitation facility (specify);other (see comments)         Problem: Inpatient Physical Therapy  Goal: Bed Mobility Goal STG- PT  Outcome: Ongoing (interventions implemented as appropriate)    04/19/17 1851   Bed Mobility PT STG   Bed Mobility PT STG, Date Established 04/19/17   Bed Mobility PT STG, Time to Achieve by  discharge   Bed Mobility PT STG, Activity Type all bed mobility   Bed Mobility PT STG, Elliott Level independent   Bed Mobility PT STG, Additional Goal able to rotate each direction in bed and complete sup-sit-sup w/o c/o neck pain   Bed Mobility PT STG, Outcome goal not met       Goal: Transfer Training Goal 1 LTG- PT  Outcome: Ongoing (interventions implemented as appropriate)    04/19/17 1851   Transfer Training PT LTG   Transfer Training PT LTG, Date Established 04/19/17   Transfer Training PT LTG, Time to Achieve by discharge   Transfer Training PT LTG, Activity Type all transfers   Transfer Training PT LTG, Elliott Level conditional independence   Transfer Training PT LTG, Assist Device other (see comments);cane, straight  (or FWRW)   Transfer Training PT LTG, Outcome goal not met       Goal: Gait Training Goal LTG- PT  Outcome: Ongoing (interventions implemented as appropriate)    04/19/17 1851   Gait Training PT LTG   Gait Training Goal PT LTG, Date Established 04/19/17   Gait Training Goal PT LTG, Time to Achieve by discharge   Gait Training Goal PT LTG, Elliott Level conditional independence   Gait Training Goal PT LTG, Assist Device cane, straight  (or fixed wheel rolling walker)   Gait Training Goal PT LTG, Distance to Achieve 600   Gait Training Goal PT LTG, Additional Goal 1000   Gait Training Goal PT LTG, Outcome goal not met       Goal: Stair Training Goal LTG- PT  Outcome: Ongoing (interventions implemented as appropriate)    04/19/17 1851   Stair Training PT LTG   Stair Training Goal PT LTG, Date Established 04/19/17   Stair Training Goal PT LTG, Time to Achieve by discharge   Stair Training Goal PT LTG, Number of Steps up/down flight of steps indep   Stair Training Goal PT LTG, Elliott Level conditional independence;independent   Stair Training Goal PT LTG, Outcome goal not met       Goal: Cardiopulmonary Goal STG- PT  Outcome: Ongoing (interventions implemented as  appropriate)    04/19/17 1851   Cardiopulmonary PT STG   Cardiopulmonary PT STG, Date Established 04/19/17   Cardiopulmonary PT STG, Time to Achieve 3 days   Cardiopulmonary PT STG, Additional Goal VSS w/ mobility, sats 91% or above   Cardiopulmonary PT STG, Outcome goal not met

## 2017-04-20 NOTE — PROGRESS NOTES
Acute Care - Physical Therapy Initial Evaluation  [facility]     Patient Name: Sterling Graves  : 1930  MRN: [id number]  Today's Date: 2017   Onset of Illness/Injury or Date of Surgery Date: 17  Date of Referral to PT: 17         Admit Date: 2017     Visit Dx:    ICD-10-CM ICD-9-CM   1. Pneumonia of right lower lobe due to infectious organism J18.9 483.8   2. Dehydration E86.0 276.51   3. Abnormality of gait and mobility R26.9 781.2     Patient Active Problem List   Diagnosis   • Essential hypertension   • Eczema   • Benign prostatic hyperplasia   • Vertigo   • Post-traumatic osteoarthritis of left knee   • Post-traumatic osteoarthritis of left hip   • Gastroesophageal reflux disease without esophagitis   • Vitamin D deficiency   • Pneumonia   • Dehydration     Past Medical History:   Diagnosis Date   • Allergic rhinitis    • Benign prostatic hyperplasia    • Cataract    • Chalazion of left lower eyelid    • Corneal abrasion, left    • Degenerative joint disease involving multiple joints    • Dizziness    • Eczema    • Essential hypertension    • Hearing loss    • History of pneumococcal vaccination    • Inguinal hernia    • Long term use of drug    • Male erectile disorder    • Onychomycosis    • Peptic ulcer    • Pseudophakia    • Rotator cuff syndrome     right shoulder   • Upper respiratory infection    • Vertigo    • Vitreous detachment     posterior with small hemorrhage        Past Surgical History:   Procedure Laterality Date   • ANKLE SURGERY  1960   • CATARACT EXTRACTION  2002   • ENDOSCOPY  2011    w/tube   • FOOT SURGERY  1997   • GTUBE INSERTION  2011   • INGUINAL HERNIA REPAIR  1999   • KNEE SURGERY  1991   • KNEE SURGERY  1990   • SHOULDER SURGERY  10/12/2004   • SKIN BIOPSY  10/23/2002   • TOTAL KNEE ARTHROPLASTY  1991          PT ASSESSMENT (last 72 hours)      PT Evaluation       17 1450 17  2200    Rehab Evaluation    Document Type evaluation  -GB     Subjective Information no complaints  -GB     Patient Effort, Rehab Treatment good  -GB     Symptoms Noted During/After Treatment fatigue  -GB     General Information    Patient Profile Review yes  -GB     Onset of Illness/Injury or Date of Surgery Date 04/18/17  -GB     Pertinent History Of Current Problem Nausea/vomitting prior to admit;   -GB     Precautions/Limitations fall precautions  -GB     Prior Level of Function independent:;all household mobility;community mobility;gait;driving;shopping;work   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  -GB     Equipment Currently Used at Home cane, straight  -GB none  -ND    Plans/Goals Discussed With patient and family  -GB     Risks Reviewed patient and family:   fatigue  -GB     Benefits Reviewed increase strength;patient and family:  -GB     Living Environment    Lives With alone  -GB alone  -ND    Living Arrangements house  -GB house  -ND    Home Accessibility bed and bath are not on the first floor  -GB no concerns  -ND    Stair Railings at Home --   granddaughter reports bedroom upstairs in house;   -GB none  -ND    Type of Financial/Environmental Concern  none  -ND    Transportation Available car  -GB car  -ND    Clinical Impression    Date of Referral to PT 04/19/17  -GB     PT Diagnosis weakness  -GB     Prognosis good  -GB     Functional Level At Time Of Evaluation needs assist for mobility  -GB     Patient/Family Goals Statement home to PLOF;   -GB     Criteria for Skilled Therapeutic Interventions Met yes  -GB     Pathology/Pathophysiology Noted (Describe Specifically for Each System) musculoskeletal  -GB     Rehab Potential good, to achieve stated therapy goals  -GB     Predicted Duration of Therapy Intervention (days/wks) 2 wks  -GB     Vital Signs    Pre Systolic BP Rehab 150  -GB     Pre Treatment Diastolic BP 62  -GB     Intra Systolic BP Rehab 156  -GB     Intra Treatment Diastolic BP 84  -GB     Post Systolic  BP Rehab 165  -GB     Post Treatment Diastolic BP 75  -GB     Pretreatment Heart Rate (beats/min) 80  -GB     Intratreatment Heart Rate (beats/min) 82  -GB     Posttreatment Heart Rate (beats/min) 74  -GB     Pre SpO2 (%) 92  -GB     O2 Delivery Pre Treatment room air  -GB     Intra SpO2 (%) 89  -GB     O2 Delivery Intra Treatment room air  -GB     Post SpO2 (%) 94  -GB     O2 Delivery Post Treatment room air  -GB     Pre Patient Position Supine  -GB     Intra Patient Position Sitting  -GB     Post Patient Position Supine  -GB     Pain Assessment    Pain Assessment 0-10  -GB     Pain Location Neck   pt c/o neck pain w/ initiaition of trunk fwd flex to get up   -GB     Response to Interventions pt c/o neck pain w/ initiation of sup-sit; CNA confirms prior c/o; no c/o once up or on return to supine; pt struggled to turn to get up initially needing cuing on bed mobility & full assist sup to sit;   -GB     Result of Injury --   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  -GB     Vision Assessment/Intervention    Visual Impairment WFL  -GB     Cognitive Assessment/Intervention    Current Cognitive/Communication Assessment functional  -GB     Orientation Status oriented to;person;place;situation  -GB     Follows Commands/Answers Questions 100% of the time  -GB     ROM (Range of Motion)    General ROM no range of motion deficits identified;head/neck/trunk range of motion deficits identified   except end range shoulder flx flower but ~100 deg flx flower;  -GB     General Head/Neck/Trunk Assessment    ROM --   very limited rotation & flex/ext each direction;says mining  -GB     MMT (Manual Muscle Testing)    General MMT Assessment no strength deficits identified   heather 4/5 resistance w/out c/o in range  -GB     Muscle Tone Assessment    Muscle Tone Assessment neck   palpation of neck showed some muscle tight,no point tender  -GB     Bed Mobility, Assessment/Treatment    Bed Mobility, Roll Left, Devol  moderate assist (50% patient effort);maximum assist (25% patient effort)  -GB     Bed Mobility, Roll Right, [place] moderate assist (50% patient effort);maximum assist (25% patient effort)  -GB     Bed Mob, Supine to Sit, [place] moderate assist (50% patient effort);maximum assist (25% patient effort);1 person + 1 person to manage equipment  -GB     Bed Mob, Sit to Supine, [place] moderate assist (50% patient effort)  -GB     Transfer Assessment/Treatment    Toilet Transfer, [place] minimum assist (75% patient effort);contact guard assist;1 person + 1 person to manage equipment  -GB     Toilet Transfer, Assistive Device straight cane  -GB     Gait Assessment/Treatment    Gait, [place] Level minimum assist (75% patient effort);1 person + 1 person to manage equipment  -GB     Gait, Assistive Device straight cane  -GB     Gait, Distance (Feet) 8   8,8  -GB     Gait, Gait Deviations forward flexed posture  -GB     Gait, Comment a little shakey in mobility/toileting but no LOB  -GB     Positioning and Restraints    Pre-Treatment Position in bed  -GB     Post Treatment Position bed  -GB     In Bed supine;call light within reach;encouraged to call for assist;exit alarm on;with family/caregiver;side rails up x2;R heel elevated;L heel elevated  -GB       User Key  (r) = Recorded By, (t) = Taken By, (c) = Cosigned By    Initials Name Provider Type    CELESTINA Doe, LORI Physical Therapist    VIKTORIYA Messina, RN Registered Nurse          Physical Therapy Education     Title: PT OT SLP Therapies (Active)     Topic: Physical Therapy (Active)     Point: Mobility training (Active)    Learning Progress Summary    Learner Readiness Method Response Comment Documented by Status   Patient Acceptance E,D NR discussed POC, mobility instructions, no OOB w/out assist; notified CNA re: pt c/o neck pain w position change; instructed granddaughter he may need assist at home post d/c; GB 04/19/17 0971  Active   Family Acceptance E,D NR discussed POC, mobility instructions, no OOB w/out assist; notified CNA re: pt c/o neck pain w position change; instructed granddaughter he may need assist at home post d/c;  04/19/17 1848 Active               Point: Home exercise program (Active)    Learning Progress Summary    Learner Readiness Method Response Comment Documented by Status   Patient Acceptance E,D NR discussed POC, mobility instructions, no OOB w/out assist; notified CNA re: pt c/o neck pain w position change; instructed granddaughter he may need assist at home post d/c;  04/19/17 1848 Active   Family Acceptance E,D NR discussed POC, mobility instructions, no OOB w/out assist; notified CNA re: pt c/o neck pain w position change; instructed granddaughter he may need assist at home post d/c;  04/19/17 1848 Active               Point: Precautions (Active)    Learning Progress Summary    Learner Readiness Method Response Comment Documented by Status   Patient Acceptance E,D NR discussed POC, mobility instructions, no OOB w/out assist; notified CNA re: pt c/o neck pain w position change; instructed granddaughter he may need assist at home post d/c;  04/19/17 1848 Active   Family Acceptance E,D NR discussed POC, mobility instructions, no OOB w/out assist; notified CNA re: pt c/o neck pain w position change; instructed granddaughter he may need assist at home post d/c;  04/19/17 1848 Active                      User Key     Initials Effective Dates Name Provider Type Discipline     10/17/16 -  Estela Doe PT Physical Therapist PT                PT Recommendation and Plan  Anticipated Equipment Needs At Discharge: front wheeled walker  Anticipated Discharge Disposition: home with 24/7 care, home with home health  Planned Therapy Interventions: balance training, bed mobility training, gait training, home exercise program, patient/family education, strengthening, stair training, transfer  training  PT Frequency: other (see comments) (5-14 x/wk)  Plan of Care Review  Plan Of Care Reviewed With: patient  Outcome Summary/Follow up Plan: pt w/ limited mobility and ex heather post recent illness; he is not safe to be home alone at this point, will need rehab services to attempt to return to prior functional level with safety; 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 [employer]          IP PT Goals       04/19/17 1851          Bed Mobility PT STG    Bed Mobility PT STG, Date Established 04/19/17  -GB      Bed Mobility PT STG, Time to Achieve by discharge  -GB      Bed Mobility PT STG, Activity Type all bed mobility  -GB      Bed Mobility PT STG, [place] Level independent  -GB      Bed Mobility PT STG, Additional Goal able to rotate each direction in bed and complete sup-sit-sup w/o c/o neck pain  -GB      Bed Mobility PT STG, Outcome goal not met  -GB      Transfer Training PT LTG    Transfer Training PT LTG, Date Established 04/19/17  -GB      Transfer Training PT LTG, Time to Achieve by discharge  -GB      Transfer Training PT LTG, Activity Type all transfers  -GB      Transfer Training PT LTG, [place] Level conditional independence  -GB      Transfer Training PT LTG, Assist Device other (see comments);cane, straight   or FWRW  -GB      Transfer Training PT LTG, Outcome goal not met  -GB      Gait Training PT LTG    Gait Training Goal PT LTG, Date Established 04/19/17  -GB      Gait Training Goal PT LTG, Time to Achieve by discharge  -GB      Gait Training Goal PT LTG, [place] Level conditional independence  -GB      Gait Training Goal PT LTG, Assist Device cane, straight   or fixed wheel rolling walker  -GB      Gait Training Goal PT LTG, Distance to Achieve 600  -GB      Gait Training Goal PT LTG, Additional Goal 1000  -GB      Gait Training Goal PT LTG, Outcome goal not met  -GB      Stair Training PT LTG    Stair Training Goal PT LTG, Date Established 04/19/17  -GB      Stair Training Goal  PT LTG, Time to Achieve by discharge  -GB      Stair Training Goal PT LTG, Number of Steps up/down flight of steps indep  -GB      Stair Training Goal PT LTG, [place] Level conditional independence;independent  -GB      Stair Training Goal PT LTG, Outcome goal not met  -GB      Cardiopulmonary PT STG    Cardiopulmonary PT STG, Date Established 04/19/17  -GB      Cardiopulmonary PT STG, Time to Achieve 3 days  -GB      Cardiopulmonary PT STG, Additional Goal VSS w/ mobility, sats 91% or above  -GB      Cardiopulmonary PT STG, Outcome goal not met  -GB        User Key  (r) = Recorded By, (t) = Taken By, (c) = Cosigned By    Initials Name Provider Type    CELESTINA Doe, LORI Physical Therapist                Outcome Measures       04/19/17 1900          How much help from another person do you currently need...    Turning from your back to your side while in flat bed without using bedrails? 2  -GB      Moving from lying on back to sitting on the side of a flat bed without bedrails? 2  -GB      Moving to and from a bed to a chair (including a wheelchair)? 2  -GB      Standing up from a chair using your arms (e.g., wheelchair, bedside chair)? 2  -GB      Climbing 3-5 steps with a railing? 2  -GB      To walk in hospital room? 3  -GB      AM-PAC 6 Clicks Score 13  -GB      Functional Assessment    Outcome Measure Options AM-PAC 6 Clicks Basic Mobility (PT)  -GB        User Key  (r) = Recorded By, (t) = Taken By, (c) = Cosigned By    Initials Name Provider Type    CELESTINA Doe, LORI Physical Therapist           Time Calculation:         PT Charges       04/19/17 1906          Time Calculation    Start Time 1450  -GB      Stop Time 1550  -GB      Time Calculation (min) 60 min  -GB      PT Received On 04/19/17  -GB      PT Goal Re-Cert Due Date 04/28/17  -GB      Time Calculation- PT    Total Timed Code Minutes- PT 30 minute(s)  -GB        User Key  (r) = Recorded By, (t) = Taken By, (c) =  Cosigned By    Initials Name Provider Type    GB Estela Doe, PT Physical Therapist          Therapy Charges for Today     Code Description Service Date Service Provider Modifiers Qty    35149313399 HC PT MOBILITY CURRENT 4/19/2017 Estela Doe, PT GP, CK 1    86288134955 HC PT MOBILITY PROJECTED 4/19/2017 Estela Doe PT GP, CI 1    45686091034 HC PT EVAL LOW COMPLEXITY 2 4/19/2017 Estela Doe PT GP 1     Duration:  30m ( 2:50 PM -  3:20 PM)      92353305508 HC PT THERAPEUTIC ACT EA 15 MIN 4/19/2017 Estela Doe PT GP 1     Duration:  30m ( 3:20 PM -  3:50 PM)            PT G-Codes  PT Professional Judgement Used?: Yes  Outcome Measure Options: AM-PAC 6 Clicks Basic Mobility (PT)  Score: 13  Functional Limitation: Mobility: Walking and moving around  Mobility: Walking and Moving Around Current Status (): At least 40 percent but less than 60 percent impaired, limited or restricted  Mobility: Walking and Moving Around Goal Status (): At least 1 percent but less than 20 percent impaired, limited or restricted      Estela Doe PT  4/19/2017

## 2017-04-21 LAB — BACTERIA SPEC AEROBE CULT: NORMAL

## 2017-04-21 PROCEDURE — 25010000002 AZITHROMYCIN: Performed by: NURSE PRACTITIONER

## 2017-04-21 PROCEDURE — G8979 MOBILITY GOAL STATUS: HCPCS

## 2017-04-21 PROCEDURE — G8978 MOBILITY CURRENT STATUS: HCPCS

## 2017-04-21 PROCEDURE — 97110 THERAPEUTIC EXERCISES: CPT

## 2017-04-21 PROCEDURE — 97530 THERAPEUTIC ACTIVITIES: CPT

## 2017-04-21 PROCEDURE — 25010000002 CEFTRIAXONE: Performed by: NURSE PRACTITIONER

## 2017-04-21 PROCEDURE — 97116 GAIT TRAINING THERAPY: CPT

## 2017-04-21 PROCEDURE — 97535 SELF CARE MNGMENT TRAINING: CPT

## 2017-04-21 PROCEDURE — 94760 N-INVAS EAR/PLS OXIMETRY 1: CPT

## 2017-04-21 PROCEDURE — 94799 UNLISTED PULMONARY SVC/PX: CPT

## 2017-04-21 RX ADMIN — SODIUM CHLORIDE 75 ML/HR: 900 INJECTION, SOLUTION INTRAVENOUS at 07:24

## 2017-04-21 RX ADMIN — IPRATROPIUM BROMIDE AND ALBUTEROL SULFATE 3 ML: 2.5; .5 SOLUTION RESPIRATORY (INHALATION) at 03:39

## 2017-04-21 RX ADMIN — IPRATROPIUM BROMIDE AND ALBUTEROL SULFATE 3 ML: 2.5; .5 SOLUTION RESPIRATORY (INHALATION) at 22:17

## 2017-04-21 RX ADMIN — METOPROLOL SUCCINATE 50 MG: 50 TABLET, EXTENDED RELEASE ORAL at 09:07

## 2017-04-21 RX ADMIN — ACETAMINOPHEN 650 MG: 325 TABLET ORAL at 12:06

## 2017-04-21 RX ADMIN — IPRATROPIUM BROMIDE AND ALBUTEROL SULFATE 3 ML: 2.5; .5 SOLUTION RESPIRATORY (INHALATION) at 15:13

## 2017-04-21 RX ADMIN — IPRATROPIUM BROMIDE AND ALBUTEROL SULFATE 3 ML: 2.5; .5 SOLUTION RESPIRATORY (INHALATION) at 11:12

## 2017-04-21 RX ADMIN — AZITHROMYCIN 500 MG: 500 INJECTION, POWDER, LYOPHILIZED, FOR SOLUTION INTRAVENOUS at 17:31

## 2017-04-21 RX ADMIN — INDAPAMIDE 2.5 MG: 2.5 TABLET, FILM COATED ORAL at 09:04

## 2017-04-21 RX ADMIN — DOCUSATE SODIUM 100 MG: 100 CAPSULE, LIQUID FILLED ORAL at 17:31

## 2017-04-21 RX ADMIN — IPRATROPIUM BROMIDE AND ALBUTEROL SULFATE 3 ML: 2.5; .5 SOLUTION RESPIRATORY (INHALATION) at 00:08

## 2017-04-21 RX ADMIN — DOCUSATE SODIUM 100 MG: 100 CAPSULE, LIQUID FILLED ORAL at 09:07

## 2017-04-21 RX ADMIN — POLYETHYLENE GLYCOL 3350 17 G: 17 POWDER, FOR SOLUTION ORAL at 09:04

## 2017-04-21 RX ADMIN — PANTOPRAZOLE SODIUM 40 MG: 40 TABLET, DELAYED RELEASE ORAL at 09:04

## 2017-04-21 RX ADMIN — SODIUM CHLORIDE 75 ML/HR: 900 INJECTION, SOLUTION INTRAVENOUS at 22:31

## 2017-04-21 RX ADMIN — IPRATROPIUM BROMIDE AND ALBUTEROL SULFATE 3 ML: 2.5; .5 SOLUTION RESPIRATORY (INHALATION) at 07:23

## 2017-04-21 RX ADMIN — POTASSIUM CHLORIDE 20 MEQ: 750 CAPSULE, EXTENDED RELEASE ORAL at 09:04

## 2017-04-21 RX ADMIN — DUTASTERIDE 0.5 MG: 0.5 CAPSULE ORAL at 09:04

## 2017-04-21 RX ADMIN — CEFTRIAXONE 1 G: 1 INJECTION, POWDER, FOR SOLUTION INTRAMUSCULAR; INTRAVENOUS at 16:24

## 2017-04-21 RX ADMIN — IPRATROPIUM BROMIDE AND ALBUTEROL SULFATE 3 ML: 2.5; .5 SOLUTION RESPIRATORY (INHALATION) at 19:56

## 2017-04-21 NOTE — PROGRESS NOTES
Acute Care - Occupational Therapy Treatment Note  [facility]     Patient Name: Sterling Graves  : 1930  MRN: [id number]  Today's Date: 2017  Onset of Illness/Injury or Date of Surgery Date: 17  Date of Referral to OT: 17  Referring Physician: LEONIE Chavez      Admit Date: 2017    Visit Dx:     ICD-10-CM ICD-9-CM   1. Pneumonia of right lower lobe due to infectious organism J18.9 483.8   2. Dehydration E86.0 276.51   3. Abnormality of gait and mobility R26.9 781.2   4. Impaired mobility and activities of daily living Z74.09 799.89     Patient Active Problem List   Diagnosis   • Essential hypertension   • Eczema   • Benign prostatic hyperplasia   • Vertigo   • Post-traumatic osteoarthritis of left knee   • Post-traumatic osteoarthritis of left hip   • Gastroesophageal reflux disease without esophagitis   • Vitamin D deficiency   • Pneumonia   • Dehydration             Adult Rehabilitation Note       17 1300          Rehab Assessment/Intervention    Discipline occupational therapy assistant  -      Document Type therapy note (daily note)  -      Subjective Information agree to therapy  -      Patient Effort, Rehab Treatment fair  -      Recorded by [] PAULINO Morales/LOGAN      Vital Signs    Pretreatment Heart Rate (beats/min) 72  -      Pre SpO2 (%) 99  -      O2 Delivery Pre Treatment room air  -      Recorded by [] PAULINO Morales/L      Pain Assessment    Pain Assessment 0-10  -      Pain Score 5  -      Post Pain Score 6  -      Pain Type Acute pain   Pt state he has been in pain since attempting to stand  -      Pain Location Hip  -      Pain Orientation Left  -      Recorded by [] PAULINO Morales/L      Cognitive Assessment/Intervention    Current Cognitive/Communication Assessment functional  -      Orientation Status oriented x 4  -      Follows Commands/Answers Questions 100% of the time  -      Recorded  by [] SEGUNDO Morales      Transfer Assessment/Treatment    Transfer, Comment Pt decline to EOB/OOB 2' L Hip pain  -JH      Recorded by [] SEGUNDO Morales      Upper Body Bathing Assessment/Training    UB Bathing Assess/Train, Comment Pt report he has had a bath  -JH      Recorded by [] SEGUNDO Morales      Therapy Exercises    Bilateral Upper Extremity AROM:;10 reps;supine;elbow flexion/extension;hand pumps;pronation/supination;shoulder abduction/adduction;shoulder extension/flexion  -JH      BUE Resistance manual resistance- minimal  -JH      Recorded by [] SEGUNDO Morales      Positioning and Restraints    Pre-Treatment Position in bed  -JH      Post Treatment Position bed  -JH      In Bed supine;call light within reach;encouraged to call for assist;exit alarm on;with family/caregiver  -JH      Recorded by [] SEGUNDO Morales        User Key  (r) = Recorded By, (t) = Taken By, (c) = Cosigned By    Initials Name Effective Dates     SEGUNDO Morales 10/17/16 -                 OT Goals       04/21/17 1300 04/20/17 1322       Transfer Training OT LTG    Transfer Training OT LTG, Date Established  04/20/17  -     Transfer Training OT LTG, Time to Achieve  by discharge  -RB     Transfer Training OT LTG, Activity Type  all transfers  -RB     Transfer Training OT LTG, [place] Level  supervision required  -RB     Transfer Training OT LTG, Assist Device  walker, rolling;cane, straight  -     Transfer Training OT LTG, Date Goal Reviewed 04/21/17  -      Transfer Training OT LTG, Outcome goal ongoing  -      Static Standing Balance OT STG    Static Standing Balance OT STG, Date Established  04/20/17  -     Static Standing Balance OT STG, Time to Achieve  5 - 7 days  -     Static Standing Balance OT STG, [place] Level  supervision   5 minutes with 0-1 rest break.  -RB     Static Standing Balance OT STG, Assist Device  assistive device   Rolling walker  or cane.  -     Static Standing Balance OT STG, Date Goal Reviewed 04/21/17  -      Static Standing Balance OT STG, Outcome goal ongoing  -      Patient Education OT STG    Patient Education OT STG, Date Established  04/20/17  -     Patient Education OT STG, Time to Achieve  5 days  -     Patient Education OT STG, Education Type  home safety;work simplification;energy conservation  -     Patient Education OT STG, Education Understanding  demonstrates adequately;verbalizes understanding  -     Patient Education OT STG, Date Goal Reviewed 04/21/17  -      Patient Education OT STG Outcome goal ongoing  -      ADL OT LTG    ADL OT LTG, Date Established  04/20/17  -     ADL OT LTG, Time to Achieve  by discharge  -     ADL OT LTG, Activity Type  ADL skills   Sponge bath and dress.  -     ADL OT LTG, [place] Level  standby assist;assistive device   Strenght cane or rolling walker.  -     ADL OT LTG, Date Goal Reviewed 04/21/17  -      ADL OT LTG, Outcome goal ongoing  -        User Key  (r) = Recorded By, (t) = Taken By, (c) = Cosigned By    Initials Name Provider Type    SOO Hu, OT Occupational Therapist     SEGUNDO Morales Occupational Therapy Assistant          Occupational Therapy Education     Title: PT OT SLP Therapies (Active)     Topic: Occupational Therapy (Done)     Point: ADL training (Done)    Description: Instruct learner(s) on proper safety adaptation and remediation techniques during self care or transfers.   Instruct in proper use of assistive devices.    Learning Progress Summary    Learner Readiness Method Response Comment Documented by Status   Patient Acceptance E [facility] 04/21/17 1341 Done   Family Acceptance E [facility] 04/21/17 1341 Done               Point: Home exercise program (Done)    Description: Instruct learner(s) on appropriate technique for monitoring, assisting and/or progressing therapeutic exercises/activities.    Learning Progress Summary     Learner Readiness Method Response Comment Documented by Status   Patient Acceptance E [facility] 04/21/17 1341 Done   Family Acceptance E [facility] 04/21/17 1341 Done               Point: Precautions (Done)    Description: Instruct learner(s) on prescribed precautions during self-care and functional transfers.    Learning Progress Summary    Learner Readiness Method Response Comment Documented by Status   Patient Acceptance E [facility] 04/21/17 1341 Done    Acceptance E NR Stressed use on non skid socks and gait belt when out of bed.  04/20/17 1320 Active   Family Acceptance E [facility] 04/21/17 1341 Done    Acceptance E NR Stressed use on non skid socks and gait belt when out of bed.  04/20/17 1320 Active               Point: Body mechanics (Done)    Description: Instruct learner(s) on proper positioning and spine alignment during self-care, functional mobility activities and/or exercises.    Learning Progress Summary    Learner Readiness Method Response Comment Documented by Status   Patient Acceptance E [facility] 04/21/17 1341 Done   Family Acceptance E [facility] 04/21/17 1341 Done                      User Key     Initials Effective Dates Name Provider Type Discipline     06/15/16 -  Jn Hu, OT Occupational Therapist OT     10/17/16 -  SEGUNDO Morales Occupational Therapy Assistant OT                  OT Recommendation and Plan  Anticipated Equipment Needs At Discharge: front wheeled walker  Anticipated Discharge Disposition: home with home health  Planned Therapy Interventions: activity intolerance, ADL retraining, balance training, transfer training  Therapy Frequency: other (see comments) (3-14x/wk)  Plan of Care Review  Outcome Summary/Follow up Plan: Pt with c/o pain to L hip, nursing aware and tests pending per family,pt agreed to simple ther/ex vs up oob        Outcome Measures       04/21/17 1300 04/20/17 1038 04/19/17 1900    How much help from another person do you currently need...    Turning  from your back to your side while in flat bed without using bedrails?   2  -GB    Moving from lying on back to sitting on the side of a flat bed without bedrails?   2  -GB    Moving to and from a bed to a chair (including a wheelchair)?   2  -GB    Standing up from a chair using your arms (e.g., wheelchair, bedside chair)?   2  -GB    Climbing 3-5 steps with a railing?   2  -GB    To walk in hospital room?   3  -GB    AM-PAC 6 Clicks Score   13  -GB    How much help from another is currently needed...    Putting on and taking off regular lower body clothing? 2  -JH 2  -RB     Bathing (including washing, rinsing, and drying) 2  - 2  -RB     Toileting (which includes using toilet bed pan or urinal) 2  - 2  -RB     Putting on and taking off regular upper body clothing 3  - 3  -RB     Taking care of personal grooming (such as brushing teeth) 3  - 3  -RB     Eating meals 4  - 4  -RB     Score 16  - 16  -RB     Functional Assessment    Outcome Measure Options  AM-PAC 6 Clicks Daily Activity (OT)  -RB AM-PAC 6 Clicks Basic Mobility (PT)  -      User Key  (r) = Recorded By, (t) = Taken By, (c) = Cosigned By    Initials Name Provider Type     Jn Hu, OT Occupational Therapist     Estela Doe, PT Physical Therapist     PAULINO Morales/L Occupational Therapy Assistant           Time Calculation:         Time Calculation- OT       04/21/17 1347          Time Calculation-     OT Start Time 1300  -      OT Stop Time 1340  -      OT Time Calculation (min) 40 min  -      Total Timed Code Minutes- OT 40 minute(s)  -      OT Received On 04/21/17  -        User Key  (r) = Recorded By, (t) = Taken By, (c) = Cosigned By    Initials Name Provider Type     PAULINO Morales/L Occupational Therapy Assistant           Therapy Charges for Today     Code Description Service Date Service Provider Modifiers Qty    97729047776  OT SELF CARE/MGMT/TRAIN EA 15 MIN 4/21/2017 Mary KWAN  Dioni, BOB/L GO 1    [id number]  OT THER PROC EA 15 MIN 4/21/2017 SEGUNDO Morales GO 2          OT G-codes  OT Professional Judgement Used?: Yes  OT Functional Scales Options: AM-PAC 6 Clicks Daily Activity (OT)  Score: 16  Functional Limitation: Self care  Self Care Current Status (): At least 40 percent but less than 60 percent impaired, limited or restricted  Self Care Goal Status (): At least 20 percent but less than 40 percent impaired, limited or restricted    SEGUNDO Morales  4/21/2017

## 2017-04-21 NOTE — SIGNIFICANT NOTE
04/21/17 1015   Rehab Evaluation   Document Type therapy note (daily note)   Subjective Information agree to therapy  (feeling better)   Patient Effort, Rehab Treatment excellent   Symptoms Noted During/After Treatment increased pain   Symptoms Noted Comment no c/o till he tried to stand, L hip pain severe; Dr & RN notified   General Information   Patient Profile Review yes   General Observations sleepy when not engaged in conversation   Precautions/Limitations fall precautions   Prior Level of Function independent:   Equipment Currently Used at Home cane, straight   Plans/Goals Discussed With patient and family   Vital Signs   Pre Systolic BP Rehab 138   Pre Treatment Diastolic BP 68   Intra Systolic BP Rehab 153   Intra Treatment Diastolic BP 79   Pretreatment Heart Rate (beats/min) 77   Intratreatment Heart Rate (beats/min) 75   Pre SpO2 (%) 98   O2 Delivery Pre Treatment room air   Intra SpO2 (%) 97   Pre Patient Position Supine   ROM (Range of Motion)   General ROM lower extremity range of motion deficits identified   General LE Assessment   ROM LLE ROM was WFL;RLE ROM was WFL;other (see comments)   ROM Detail (pt c/o wt bearing and then follow up position change p stand)   MMT (Manual Muscle Testing)   General MMT Assessment other (see comments)   General MMT Assessment Detail heather MMT, palp & compression LLE joint and MMT 4/5 hip, knee , ankle  flx/ext   Bed Mobility, Assessment/Treatment   Bed Mob, Supine to Sit, [place] moderate assist (50% patient effort)   Bed Mob, Sit to Supine, [place] maximum assist (25% patient effort);2 person assist required   Transfer Assessment/Treatment   Transfers, Sit-Stand [place] moderate assist (50% patient effort);maximum assist (25% patient effort);2 person assist required   Transfers, Stand-Sit [place] maximum assist (25% patient effort);2 person assist required   Transfers, Sit-Stand-Sit, Assist Device rolling walker   Transfer, Comment 3 attempts,  couldn't stand due to pain L hip/LE   General Interventions   Planned Therapy Interventions bed mobility training;ROM (Range of Motion);transfer training   Positioning and Restraints   Pre-Treatment Position in bed   Post Treatment Position bed   In Bed supine;call light within reach;encouraged to call for assist;exit alarm on;with family/caregiver;with nsg

## 2017-04-21 NOTE — PLAN OF CARE
Problem: Patient Care Overview (Adult)  Goal: Plan of Care Review  Outcome: Ongoing (interventions implemented as appropriate)    04/21/17 0415 04/21/17 1300   Coping/Psychosocial Response Interventions   Plan Of Care Reviewed With patient --    Patient Care Overview   Progress no change --    Outcome Evaluation   Outcome Summary/Follow up Plan --  Pt with c/o pain to L hip, nursing aware and tests pending per family,pt agreed to simple ther/ex vs up oob       Goal: Discharge Needs Assessment  Outcome: Ongoing (interventions implemented as appropriate)    04/19/17 1851 04/20/17 1038 04/21/17 0415   Discharge Needs Assessment   Concerns To Be Addressed --  --  home safety concerns   Concerns Comments may need family/assist at home if d/c w/out fully meeting all mobility and self care goals; --  --    Community Agency Name(S) HH PT to follow prn --  --    Current Health   Outpatient/Agency/Support Group Needs inpatient rehabilitation facility (specify);other (see comments) --  --    Living Environment   Transportation Available --  car --    Self-Care   Equipment Currently Used at Home --  --  --      04/21/17 1015   Discharge Needs Assessment   Concerns To Be Addressed --    Concerns Comments --    Community Agency Name(S) --    Current Health   Outpatient/Agency/Support Group Needs --    Living Environment   Transportation Available --    Self-Care   Equipment Currently Used at Home cane, straight         Problem: Inpatient Occupational Therapy  Goal: Transfer Training Goal 1 LTG- OT  Outcome: Ongoing (interventions implemented as appropriate)    04/20/17 1322 04/21/17 1300   Transfer Training OT LTG   Transfer Training OT LTG, Date Established 04/20/17 --    Transfer Training OT LTG, Time to Achieve by discharge --    Transfer Training OT LTG, Activity Type all transfers --    Transfer Training OT LTG, [place] Level supervision required --    Transfer Training OT LTG, Assist Device walker, rolling;cane,  straight --    Transfer Training OT LTG, Date Goal Reviewed --  04/21/17   Transfer Training OT LTG, Outcome --  goal ongoing       Goal: Static Standing Balance Goal OT- STG  Outcome: Ongoing (interventions implemented as appropriate)    04/20/17 1322 04/21/17 1300   Static Standing Balance OT STG   Static Standing Balance OT STG, Date Established 04/20/17 --    Static Standing Balance OT STG, Time to Achieve 5 - 7 days --    Static Standing Balance OT STG, [place] Level supervision  (5 minutes with 0-1 rest break.) --    Static Standing Balance OT STG, Assist Device assistive device  (Rolling walker or cane.) --    Static Standing Balance OT STG, Date Goal Reviewed --  04/21/17   Static Standing Balance OT STG, Outcome --  goal ongoing       Goal: Patient Education Goal STG- OT  Outcome: Ongoing (interventions implemented as appropriate)    04/20/17 1322 04/21/17 1300   Patient Education OT STG   Patient Education OT STG, Date Established 04/20/17 --    Patient Education OT STG, Time to Achieve 5 days --    Patient Education OT STG, Education Type home safety;work simplification;energy conservation --    Patient Education OT STG, Education Understanding demonstrates adequately;verbalizes understanding --    Patient Education OT STG, Date Goal Reviewed --  04/21/17   Patient Education OT STG Outcome --  goal ongoing       Goal: ADL Goal LTG- OT  Outcome: Ongoing (interventions implemented as appropriate)    04/20/17 1322 04/21/17 1300   ADL OT LTG   ADL OT LTG, Date Established 04/20/17 --    ADL OT LTG, Time to Achieve by discharge --    ADL OT LTG, Activity Type ADL skills  (Sponge bath and dress.) --    ADL OT LTG, [place] Level standby assist;assistive device  (Strenght cane or rolling walker.) --    ADL OT LTG, Date Goal Reviewed --  04/21/17   ADL OT LTG, Outcome --  goal ongoing

## 2017-04-21 NOTE — CONSULTS
"Adult Nutrition  Assessment    Patient Name:  Sterling Graves  YOB: 1930  MRN: [id number]  Admit Date:  4/18/2017    Assessment Date:  4/21/2017          Reason for Assessment       04/21/17 1748    Reason for Assessment    Reason For Assessment/Visit follow up protocol                Anthropometrics       04/21/17 1748    Anthropometrics    Height 170.2 cm (67.01\")    Weight 65.8 kg (145 lb 1 oz)    Ideal Body Weight (IBW)    Ideal Body Weight (IBW), Male (kg) 68.12    % Ideal Body Weight 96.8    Body Mass Index (BMI)    BMI (kg/m2) 22.76    BMI Grade 19.1 - 24.9 - normal            Labs/Tests/Procedures/Meds       04/21/17 1749    Labs/Tests/Procedures/Meds    Labs/Tests Review Glucose;Hgb Hct    Medication Review Reviewed, pertinent                Nutrition Prescription Ordered       04/21/17 1749    Nutrition Prescription PO    Current PO Diet Regular    Supplement Ensure Enlive    Supplement Frequency 3 times a day    Common Modifiers Consistent Carbohydrate            Evaluation of Received Nutrient/Fluid Intake       04/21/17 1750    PO Evaluation    Number of Meals 7    % PO Intake 100% - 1x, 75% - 1x, 50% - 1x, 25% - 4x            Comments:  Son present.  Indicates pt appetite has improved.  Indicates pt likes the Ensure he receives.  Willing for pt to receive Magic Cups.  Intake 100% - 1x, 75% - 1x, 50% - 1x, 25% - 4x.  Pt reported to have hx nausea/vomiting.  Protonix, PRN zofran prescribed.  Labs reviewed.          Electronically signed by:  Jaclyn Humphrey RD  04/21/17 5:52 PM  "

## 2017-04-21 NOTE — PROGRESS NOTES
[facility] Medicine Services  INPATIENT PROGRESS NOTE     LOS: 3 days   Patient Care Team:  Augustin Jones MD as PCP - General (Family Medicine)    Chief Complaint: Patient feels a lot better today.  Pain on the right side of the neck has much improved.  X-ray of the C-spine showed degenerative arthritis from C5 to C7.  He also is less deconditioned and did well with physiotherapy although it was recommended that he might need some home health assistance on discharge..       Subjective     Interval History:     Patient Complaints:     History taken from:     Review of Systems:    Review of Systems   Constitutional: Positive for fatigue. Negative for chills and fever.   Respiratory: Negative for cough, chest tightness, shortness of breath and wheezing.    Cardiovascular: Negative for chest pain, palpitations and leg swelling.   Gastrointestinal: Negative for abdominal pain, constipation, diarrhea and vomiting.   Genitourinary: Negative for dysuria, flank pain, frequency, hematuria and urgency.   Musculoskeletal: Positive for arthralgias, myalgias and neck pain. Negative for neck stiffness.   Neurological: Positive for weakness. Negative for dizziness, tremors, speech difficulty, numbness and headaches.   Psychiatric/Behavioral: Negative for agitation and confusion.         Objective     Vital Signs  Temp:  [98.2 °F (36.8 °C)-100 °F (37.8 °C)] 99.6 °F (37.6 °C)  Heart Rate:  [70-88] 74  Resp:  [18-20] 18  BP: (115-160)/(59-82) 129/82    Physical Exam:   Physical Exam   Abdominal: Soft. Bowel sounds are normal. He exhibits no distension. There is no tenderness.   Nursing note and vitals reviewed.  Cardiovascular: Regular rate and rhythm.  Lungs: Good air entry bilaterally.  Extremities: No edema.   Results Review:       Results from last 7 days  Lab Units 04/19/17  0708 04/18/17  1353   SODIUM mmol/L 138 136*   POTASSIUM mmol/L 3.9 4.1   CHLORIDE mmol/L 103 96   TOTAL CO2  mmol/L 25.0 27.0   BUN mg/dL 17 28*   CREATININE mg/dL 0.78 0.98   GLUCOSE mg/dL 113* 106*   CALCIUM mg/dL 8.8 9.2   BILIRUBIN mg/dL  --  0.9   ALK PHOS U/L  --  77   ALT (SGPT) U/L  --  16*   AST (SGOT) U/L  --  44               Results from last 7 days  Lab Units 04/19/17  0708 04/18/17  1353   WBC 10*3/mm3 6.55 8.87   HEMOGLOBIN g/dL 12.1* 12.4*   HEMATOCRIT % 35.5* 36.2*   PLATELETS 10*3/mm3 152 157       Lab Results   Component Value Date    TROPONINI <0.012 04/18/2017       CO2   Date Value Ref Range Status   04/19/2017 25.0 22.0 - 31.0 mmol/L Final              Imaging Results (last 7 days)     Procedure Component Value Units Date/Time    CT Abdomen Pelvis With Contrast [[id number]] Collected:  04/18/17 1640     Updated:  04/18/17 1655    Narrative:         EXAMINATION:  Computed Tomography           REGION:    Abdomen / Pelvis                     INDICATION:    Generalized abdominal pain      HISTORY:    Prostate carcinoma      LINDA. IMAGING:    none            TECHNIQUE:      - reconstructions:    axial, coronal, sagittal         - contrast:      oral:  Yes ;   intravenous:  Yes -Isovue 300,  80 mL     This exam was performed according to the departmental  dose-optimization program which includes automated exposure  control, adjustment of the mA and/or kV according to patient size  and/or use of iterative reconstruction technique.           COMMENTS:            - - - CT ABDOMEN - - -          THORAX (INFERIOR):      - LUNG BASES:  clear      - PLEURA:    no fluid or mass      - HEART:    normal size, no pericardial fluid     - MISC:      n/a          ABDOMEN:     - LIVER:    normal size / contour, no ductal dilatation , single  focal hypoattenuating lesion in the left lobe measuring 0.8 cm,  nonenhancing, presumably of a benign etiology.   - GB:      grossly negative    - CBD:      grossly negative   - SPLEEN:    normal size and contour   - PANCREAS:    normal in size, contour, no focal mass    - VISCERA:     normal caliber, no wall thickening     - MESENTERY:  no mesenteric mass   - CAVITY:    no free abdominal fluid, no free intraperitoneal  air   - BODY WALL:  wnl   - OSSEOUS:  Degenerative changes associated with the inferior  lumbar spine with moderate to high-grade acquired spinal stenosis  at the L4/5 and L3/4 levels with high-grade spinal neural  foraminal narrowing on the right at these levels.   - MISC:                                      RETROPERITONEUM:   - KIDNEYS:    normal size / contour, no collecting system  dilation; multiple nonenhancing hypoattenuating lesions, likely  cysts. No evidence of an enhancing mass.   - URETERS:    normal course, caliber   - ADRENALS:    normal size, contour   - MISC:      no sig retroperitoneal adenopathy or mass   - VASCULAR:    aorta / iliacs: wnl for age     - - - CT PELVIS - - -      - VISCERA:    normal caliber small/large bowel, no focal  thickening/mass    - MESENTERY:  no mass   - VASCULAR:    wnl for age   - CAVITY:    no free fluid / air   - BLADDER:    unremarkable   - OSSEOUS:    wnl    - MISC:   - PROSTATE:  1.3 cm bulge in the right peripheral zone with mild  enhancement, suspicious for the presence of a neoplastic process.  Suggest correlation with PSA. (Axial 78/103)     .       Impression:        CONCLUSION:  1. No evidence of an acute intra-abdominal/pelvic process.  2. Right lower lobe airspace disease likely representing  pneumonia.  3. Multilevel degenerative changes in the lumbar spine as  described above, suggest correlation with MRI.  4. Nodule in the prostate gland, suspicious for the presence of a  neoplastic process, and correlation with PSA is recommended.  Biopsy suggested.      Electronically signed by:  GLENIS Gutierrez MD  4/18/2017 4:54  PM CDT Workstation: [employer]    XR Chest PA & Lateral [[id number]] Collected:  04/20/17 1134     Updated:  04/20/17 1136    Narrative:         EXAM:          Radiograph(s), Chest   VIEWS:    PA / Lat ; 2        DATE/TIME:  4/20/2017 11:34 AM CDT               INDICATION:    Pneumonia follow-up           COMPARISON:  CXR: none          FINDINGS:             - lines/tubes:    none     - cardiac:         size within normal limits         - mediastinum: contour within normal limits         - lungs:         Persistent airspace changes in the right base.  The remainder of the lungs are clear.             - pleura:         no evidence of  fluid                  - osseous:         unremarkable for age                  - misc.:         Impression:       CONCLUSION:        1. Persistent focal airspace disease in the right base,  presumably posterior basilar segment. Continued radiographic  follow-up to clearing is recommended.                                   Electronically signed by:  GLENIS Gutierrez MD  4/20/2017 11:35  AM CDT Workstation: [facility] Spine Cervical 2 or 3 View [[id number]] Collected:  04/20/17 1518     Updated:  04/20/17 1521    Narrative:       Patient Name:  MR. JUSTINO PIZARRO  Patient ID:  [id number]   Ordering:  FRANCISCO SAWANT  Attending:  ALLAN OWENS  Referring:  FRANCISCO SAWANT  ------------------------------------------------    Procedure: Cervical spine    Indication:  Right neck pain       Technique:  Three    views    Prior relevant exam:  None    Excessive degenerative, spondylotic changes. Exuberant anterior  osteophytes and fusing syndesmophytes at C5-C6 and C6-C7. Disc  space narrowing C6-C7. The cervical spine is otherwise  unremarkable. No evidence of any acute fracture subluxation or  bony destruction.      Impression:       CONCLUSION: Degenerative, spondylotic changes.    Electronically signed by:  Pedro Terrazas MD  4/20/2017 3:20 PM CDT  Workstation: [id number]           Blood Culture   Date Value Ref Range Status   04/18/2017 No growth at 2 days  Preliminary                    Urine Culture   Date Value Ref Range Status   04/19/2017 No growth at 24 hours   Final           Medication Review:   Current Facility-Administered Medications   Medication Dose Route Frequency Provider Last Rate Last Dose   • acetaminophen (TYLENOL) tablet 650 mg  650 mg Oral Q4H PRN Magda Chavez APRN   650 mg at 04/21/17 1206   • cefTRIAXone (ROCEPHIN) 1 g/100 mL 0.9% NS (MBP)  1 g Intravenous Q24H Magda Chavez APRN   1 g at 04/20/17 1638    And   • AZITHROMYCIN 500 MG/250 ML 0.9% NS IVPB (vial-mate)  500 mg Intravenous Q24H Magda Chavez APRN   500 mg at 04/20/17 1836   • docusate sodium (COLACE) capsule 100 mg  100 mg Oral BID Magda Chavez APRN   100 mg at 04/21/17 0907   • dutasteride (AVODART) capsule 0.5 mg  0.5 mg Oral Daily Magda Chavez APRN   0.5 mg at 04/21/17 0904   • indapamide (LOZOL) tablet 2.5 mg  2.5 mg Oral Daily Magda Chavez APRN   2.5 mg at 04/21/17 0904   • ipratropium-albuterol (DUO-NEB) nebulizer solution 3 mL  3 mL Nebulization Q4H - RT LEONIE Vargas   3 mL at 04/21/17 1112   • metoprolol succinate XL (TOPROL-XL) 24 hr tablet 50 mg  50 mg Oral Daily Magda Chavez APRN   50 mg at 04/21/17 0907   • Morphine sulfate (PF) injection 1 mg  1 mg Intravenous Q4H PRN Doug Dumont MD   1 mg at 04/20/17 1216   • ondansetron (ZOFRAN) tablet 4 mg  4 mg Oral Q6H PRN LEONIE Vargas        Or   • ondansetron ODT (ZOFRAN-ODT) disintegrating tablet 4 mg  4 mg Oral Q6H PRN LEONIE Vargas        Or   • ondansetron (ZOFRAN) injection 4 mg  4 mg Intravenous Q6H PRN LEONIE Vargas       • pantoprazole (PROTONIX) EC tablet 40 mg  40 mg Oral Q AM Magda Chavez APRN   40 mg at 04/21/17 0904   • polyethylene glycol (MIRALAX) powder 17 g  17 g Oral Daily LEONIE Vargas   17 g at 04/21/17 0904   • potassium chloride (MICRO-K) CR capsule 20 mEq  20 mEq Oral Daily LEONIE Vargas   20 mEq at 04/21/17 0904   • sodium chloride 0.9 % flush 1-10 mL  1-10 mL Intravenous PRN LEONIE Vargas       • sodium chloride 0.9 % flush 10 mL  10 mL Intravenous PRN  Jen Pelayo MD   10 mL at 04/18/17 1630   • sodium chloride 0.9 % infusion  75 mL/hr Intravenous Continuous LEONIE Vargas 75 mL/hr at 04/21/17 0724 75 mL/hr at 04/21/17 0724         Assessment/Plan     1.  Pneumonia: Improving.  2.  Neck pain: Improved and due to degenerative arthritis.  3.  Disposition: Likely discharge in a..      Ahsan Brunner MD  04/21/17      EMR Dragon/Transcription disclaimer:   Much of this encounter note is an electronic transcription/translation of spoken language to printed text. The electronic translation of spoken language may permit erroneous, or at times, nonsensical words or phrases to be inadvertently transcribed; Although I have reviewed the note for such errors, some may still exist.

## 2017-04-21 NOTE — PLAN OF CARE
Problem: Patient Care Overview (Adult)  Goal: Plan of Care Review  Outcome: Ongoing (interventions implemented as appropriate)    04/21/17 0415   Coping/Psychosocial Response Interventions   Plan Of Care Reviewed With patient   Patient Care Overview   Progress no change   Outcome Evaluation   Outcome Summary/Follow up Plan VS stable; up often to bathroom, rested between       Goal: Adult Individualization and Mutuality  Outcome: Ongoing (interventions implemented as appropriate)  Goal: Discharge Needs Assessment  Outcome: Ongoing (interventions implemented as appropriate)    Problem: Pneumonia (Adult)  Goal: Signs and Symptoms of Listed Potential Problems Will be Absent or Manageable (Pneumonia)  Outcome: Ongoing (interventions implemented as appropriate)

## 2017-04-21 NOTE — PLAN OF CARE
Problem: Patient Care Overview (Adult)  Goal: Plan of Care Review  Outcome: Ongoing (interventions implemented as appropriate)    04/21/17 1532   Coping/Psychosocial Response Interventions   Plan Of Care Reviewed With patient;family   Patient Care Overview   Progress improving   Outcome Evaluation   Outcome Summary/Follow up Plan pt had muscle spasm of left upper muscle leg; was not able to work with therapy at one point due to pain; pt is better now and does not complain of any pain in that area       Goal: Adult Individualization and Mutuality  Outcome: Ongoing (interventions implemented as appropriate)  Goal: Discharge Needs Assessment  Outcome: Ongoing (interventions implemented as appropriate)    Problem: Pneumonia (Adult)  Goal: Signs and Symptoms of Listed Potential Problems Will be Absent or Manageable (Pneumonia)  Outcome: Ongoing (interventions implemented as appropriate)

## 2017-04-21 NOTE — PLAN OF CARE
Problem: Patient Care Overview (Adult)  Goal: Plan of Care Review  Outcome: Ongoing (interventions implemented as appropriate)    04/21/17 1507   Coping/Psychosocial Response Interventions   Plan Of Care Reviewed With patient;family   Patient Care Overview   Progress improving   Outcome Evaluation   Outcome Summary/Follow up Plan pt initially had severe LLE pain w/ sit to stand but improved pm to be able to walk; will need cont work on gait, RW, and stair training; if home before goals met, rec.  PT       Goal: Adult Individualization and Mutuality  Outcome: Ongoing (interventions implemented as appropriate)    04/21/17 1507   Individualization   Patient Specific Preferences family state they will provide assist at home;    Patient Specific Interventions continues to need work for stair training;        Goal: Discharge Needs Assessment  Outcome: Ongoing (interventions implemented as appropriate)    Problem: Inpatient Physical Therapy  Goal: Bed Mobility Goal STG- PT  Outcome: Ongoing (interventions implemented as appropriate)    04/19/17 1851   Bed Mobility PT STG   Bed Mobility PT STG, Date Established 04/19/17   Bed Mobility PT STG, Time to Achieve by discharge   Bed Mobility PT STG, Activity Type all bed mobility   Bed Mobility PT STG, [place] Level independent   Bed Mobility PT STG, Additional Goal able to rotate each direction in bed and complete sup-sit-sup w/o c/o neck pain   Bed Mobility PT STG, Outcome goal not met       Goal: Transfer Training Goal 1 LTG- PT  Outcome: Ongoing (interventions implemented as appropriate)    04/19/17 1851   Transfer Training PT LTG   Transfer Training PT LTG, Date Established 04/19/17   Transfer Training PT LTG, Time to Achieve by discharge   Transfer Training PT LTG, Activity Type all transfers   Transfer Training PT LTG, [place] Level conditional independence   Transfer Training PT LTG, Assist Device other (see comments);cane, straight  (or FWRW)   Transfer  Training PT LTG, Outcome goal not met       Goal: Gait Training Goal LTG- PT  Outcome: Ongoing (interventions implemented as appropriate)    04/19/17 1851 04/21/17 1507   Gait Training PT LTG   Gait Training Goal PT LTG, Date Established 04/19/17 --    Gait Training Goal PT LTG, Time to Achieve by discharge --    Gait Training Goal PT LTG, [place] Level conditional independence --    Gait Training Goal PT LTG, Assist Device --  walker, rolling   Gait Training Goal PT LTG, Distance to Achieve 600 --    Gait Training Goal PT LTG, Additional Goal 1000 --    Gait Training Goal PT LTG, Outcome goal not met --        Goal: Stair Training Goal LTG- PT  Outcome: Ongoing (interventions implemented as appropriate)    04/19/17 1851 04/21/17 1507   Stair Training PT LTG   Stair Training Goal PT LTG, Date Established 04/19/17 --    Stair Training Goal PT LTG, Time to Achieve by discharge --    Stair Training Goal PT LTG, Number of Steps up/down flight of steps indep --    Stair Training Goal PT LTG, [place] Level conditional independence;independent --    Stair Training Goal PT LTG, Outcome goal not met --    Stair Training Goal PT LTG, Reason Goal Not Met --  progress slower than expected  (participating better now)

## 2017-04-21 NOTE — PROGRESS NOTES
Acute Care - Physical Therapy Treatment Note  [facility]     Patient Name: Sterling Graves  : 1930  MRN: [id number]  Today's Date: 2017  Onset of Illness/Injury or Date of Surgery Date: 17  Date of Referral to PT: 17  Referring Physician: LEONIE Chavez    Admit Date: 2017    Visit Dx:    ICD-10-CM ICD-9-CM   1. Pneumonia of right lower lobe due to infectious organism J18.9 483.8   2. Dehydration E86.0 276.51   3. Abnormality of gait and mobility R26.9 781.2   4. Impaired mobility and activities of daily living Z74.09 799.89     Patient Active Problem List   Diagnosis   • Essential hypertension   • Eczema   • Benign prostatic hyperplasia   • Vertigo   • Post-traumatic osteoarthritis of left knee   • Post-traumatic osteoarthritis of left hip   • Gastroesophageal reflux disease without esophagitis   • Vitamin D deficiency   • Pneumonia   • Dehydration          17 1015   Rehab Evaluation   Document Type therapy note (daily note)   Subjective Information agree to therapy  (feeling better)   Patient Effort, Rehab Treatment excellent   Symptoms Noted During/After Treatment increased pain   Symptoms Noted Comment no c/o till he tried to stand, L hip pain severe; Dr & RN notified   General Information   Patient Profile Review yes   General Observations sleepy when not engaged in conversation   Precautions/Limitations fall precautions   Prior Level of Function independent:   Equipment Currently Used at Home cane, straight   Plans/Goals Discussed With patient and family   Vital Signs   Pre Systolic BP Rehab 138   Pre Treatment Diastolic BP 68   Intra Systolic BP Rehab 153   Intra Treatment Diastolic BP 79   Pretreatment Heart Rate (beats/min) 77   Intratreatment Heart Rate (beats/min) 75   Pre SpO2 (%) 98   O2 Delivery Pre Treatment room air   Intra SpO2 (%) 97   Pre Patient Position Supine   ROM (Range of Motion)   General ROM lower extremity range of motion deficits  identified   General LE Assessment   ROM LLE ROM was WFL;RLE ROM was WFL;other (see comments)   ROM Detail (pt c/o wt bearing and then follow up position change p stand)   MMT (Manual Muscle Testing)   General MMT Assessment other (see comments)   General MMT Assessment Detail heather MMT, palp & compression LLE joint and MMT 4/5 hip, knee , ankle  flx/ext   Bed Mobility, Assessment/Treatment   Bed Mob, Supine to Sit, [place] moderate assist (50% patient effort)   Bed Mob, Sit to Supine, [place] maximum assist (25% patient effort);2 person assist required   Transfer Assessment/Treatment   Transfers, Sit-Stand [place] moderate assist (50% patient effort);maximum assist (25% patient effort);2 person assist required   Transfers, Stand-Sit [place] maximum assist (25% patient effort);2 person assist required   Transfers, Sit-Stand-Sit, Assist Device rolling walker   Transfer, Comment 3 attempts, couldn't stand due to pain L hip/LE   General Interventions   Planned Therapy Interventions bed mobility training;ROM (Range of Motion);transfer training   Positioning and Restraints   Pre-Treatment Position in bed   Post Treatment Position bed   In Bed supine;call light within reach;encouraged to call for assist;exit alarm on;with family/caregiver;with nsg        04/21/17 9395   Rehab Evaluation   Document Type therapy note (daily note)   Subjective Information agree to therapy   Patient Effort, Rehab Treatment excellent   General Information   Patient Profile Review yes   Precautions/Limitations fall precautions   Living Environment   Living Environment Comment family state he will have help at home; not placing him in SNF   Clinical Impression   PT Diagnosis decreased gait/mobility and function post dehydration   Prognosis good   Vital Signs   Pretreatment Heart Rate (beats/min) 74   Posttreatment Heart Rate (beats/min) 88   Pre SpO2 (%) 96   Intra SpO2 (%) 98   Pre Patient Position Supine   Post Patient  Position Sitting   Pain Assessment   Pain Assessment No/denies pain   Pain Location Hip  (pt initially very painful attempt to stand;able on 3rd try )   Pain Orientation Left   Response to Interventions able to stand after sit to stand; LE ex w/ resistance to thigh abd-add and general ankle pumps on EOB;   ROM (Range of Motion)   General ROM other (see comments)   General ROM Detail initially c/o pain L quads; better post sit to stand & LAQ's  x 3   Bed Mobility, Assessment/Treatment   Bed Mob, Supine to Sit, [place] moderate assist (50% patient effort);2 person assist required   Bed Mob, Sit to Supine, [place] moderate assist (50% patient effort)   Transfer Assessment/Treatment   Transfers, Sit-Stand [place] maximum assist (25% patient effort);2 person assist required   Transfers, Stand-Sit [place] moderate assist (50% patient effort);2 person assist required   Transfer, Comment attempted stand x 3; stood fully last time; othewise stood ~50 %   Gait Assessment/Treatment   Gait, [place] Level minimum assist (75% patient effort);1 person + 1 person to manage equipment   Gait, Assistive Device rolling walker   Gait, Distance (Feet) 192   Gait, Gait Pattern Analysis swing-through gait   Gait, Comment once slowly standing, he took steps, then reg iwlfredo per familly   Therapy Exercises   LLE Resistance manual resistance- minimal   Bilateral Lower Extremities AROM:;10 reps;sitting;ankle pumps/circles;LAQ   General Interventions   Planned Therapy Interventions balance training;bed mobility training;gait training;patient/family education;ROM (Range of Motion);stair training;transfer training   Positioning and Restraints   Pre-Treatment Position in bed   Post Treatment Position bed   In Bed notified nsg;supine;call light within reach;encouraged to call for assist;exit alarm on;with family/caregiver             IP PT Goals       04/21/17 1507 04/21/17 1502 04/19/17 1851    Bed Mobility PT STG    Bed  Mobility PT STG, Date Established   04/19/17  -GB    Bed Mobility PT STG, Time to Achieve   by discharge  -GB    Bed Mobility PT STG, Activity Type   all bed mobility  -GB    Bed Mobility PT STG, [place] Level   independent  -GB    Bed Mobility PT STG, Additional Goal   able to rotate each direction in bed and complete sup-sit-sup w/o c/o neck pain  -GB    Bed Mobility PT STG, Outcome   goal not met  -GB    Transfer Training PT LTG    Transfer Training PT LTG, Date Established   04/19/17  -GB    Transfer Training PT LTG, Time to Achieve   by discharge  -GB    Transfer Training PT LTG, Activity Type   all transfers  -GB    Transfer Training PT LTG, [place] Level   conditional independence  -GB    Transfer Training PT LTG, Assist Device   other (see comments);cane, straight   or FWRW  -GB    Transfer Training PT LTG, Outcome   goal not met  -GB    Gait Training PT LTG    Gait Training Goal PT LTG, Date Established   04/19/17  -GB    Gait Training Goal PT LTG, Time to Achieve   by discharge  -GB    Gait Training Goal PT LTG, [place] Level   conditional independence  -GB    Gait Training Goal PT LTG, Assist Device walker, rolling  -GB  cane, straight   or fixed wheel rolling walker  -GB    Gait Training Goal PT LTG, Distance to Achieve   600  -GB    Gait Training Goal PT LTG, Additional Goal   1000  -GB    Gait Training Goal PT LTG, Outcome   goal not met  -GB    Stair Training PT LTG    Stair Training Goal PT LTG, Date Established   04/19/17  -GB    Stair Training Goal PT LTG, Time to Achieve   by discharge  -GB    Stair Training Goal PT LTG, Number of Steps   up/down flight of steps indep  -GB    Stair Training Goal PT LTG, [place] Level   conditional independence;independent  -GB    Stair Training Goal PT LTG, Outcome   goal not met  -GB    Stair Training Goal PT LTG, Reason Goal Not Met progress slower than expected   participating better now  - (P)  medical status inhibits participation;unable  to make needed progress  -     Cardiopulmonary PT STG    Cardiopulmonary PT STG, Date Established   04/19/17  -    Cardiopulmonary PT STG, Time to Achieve   3 days  -GB    Cardiopulmonary PT STG, Additional Goal   VSS w/ mobility, sats 91% or above  -GB    Cardiopulmonary PT STG, Outcome  (P)  goal met  -GB goal not met  -GB      User Key  (r) = Recorded By, (t) = Taken By, (c) = Cosigned By    Initials Name Provider Type    CELESTINA Doe, PT Physical Therapist          Physical Therapy Education     Title: PT OT SLP Therapies (Done)     Topic: Physical Therapy (Done)     Point: Mobility training (Done)    Learning Progress Summary    Learner Readiness Method Response Comment Documented by Status   Patient Acceptance D,E TERESA FISH,NR instructed on concern for LLE soreness; POC; accepted instruction well; suggested HH PT for steps; instructed family if pt at home, to provide assist at home for all mobility until reliably cleared for indep in gait, particularly with stairs to bedroom  04/21/17 1457 Done    Acceptance E,D NR discussed POC, mobility instructions, no OOB w/out assist; notified CNA re: pt c/o neck pain w position change; instructed granddaughter he may need assist at home post d/c;  04/19/17 1848 Active   Family Acceptance D,E TERESA FISH,NR instructed on concern for LLE soreness; POC; accepted instruction well; suggested HH PT for steps; instructed family if pt at home, to provide assist at home for all mobility until reliably cleared for indep in gait, particularly with stairs to bedroom  04/21/17 1457 Done    Acceptance E,D NR discussed POC, mobility instructions, no OOB w/out assist; notified CNA re: pt c/o neck pain w position change; instructed granddaughter he may need assist at home post d/c;  04/19/17 1848 Active               Point: Home exercise program (Done)    Learning Progress Summary    Learner Readiness Method Response Comment Documented by Status   Patient Acceptance D,E  TERESA FISH NR instructed on concern for LLE soreness; POC; accepted instruction well; suggested HH PT for steps; instructed family if pt at home, to provide assist at home for all mobility until reliably cleared for indep in gait, particularly with stairs to bedroom  04/21/17 1457 Done    Acceptance E,D NR discussed POC, mobility instructions, no OOB w/out assist; notified CNA re: pt c/o neck pain w position change; instructed granddaughter he may need assist at home post d/c;  04/19/17 1848 Active   Family Acceptance D,E TERESA FISH NR instructed on concern for LLE soreness; POC; accepted instruction well; suggested HH PT for steps; instructed family if pt at home, to provide assist at home for all mobility until reliably cleared for indep in gait, particularly with stairs to bedroom  04/21/17 1457 Done    Acceptance E,D NR discussed POC, mobility instructions, no OOB w/out assist; notified CNA re: pt c/o neck pain w position change; instructed granddaughter he may need assist at home post d/c;  04/19/17 1848 Active               Point: Precautions (Done)    Learning Progress Summary    Learner Readiness Method Response Comment Documented by Status   Patient Acceptance D,E TERESA FISH NR instructed on concern for LLE soreness; POC; accepted instruction well; suggested HH PT for steps; instructed family if pt at home, to provide assist at home for all mobility until reliably cleared for indep in gait, particularly with stairs to bedroom  04/21/17 1457 Done    Acceptance E,D NR discussed POC, mobility instructions, no OOB w/out assist; notified CNA re: pt c/o neck pain w position change; instructed granddaughter he may need assist at home post d/c;  04/19/17 1848 Active   Family Acceptance D,E TERESA FISH NR instructed on concern for LLE soreness; POC; accepted instruction well; suggested HH PT for steps; instructed family if pt at home, to provide assist at home for all mobility until reliably cleared for indep in gait,  particularly with stairs to bedroom  04/21/17 1457 Done    Acceptance E,D NR discussed POC, mobility instructions, no OOB w/out assist; notified CNA re: pt c/o neck pain w position change; instructed granddaughter he may need assist at home post d/c;  04/19/17 4562 Active                      User Key     Initials Effective Dates Name Provider Type Discipline     10/17/16 -  Estela Doe, PT Physical Therapist PT                    PT Recommendation and Plan  Anticipated Equipment Needs At Discharge: front wheeled walker  Anticipated Discharge Disposition: home with / care, home with home health  Planned Therapy Interventions: balance training, bed mobility training, gait training, patient/family education, ROM (Range of Motion), stair training, transfer training  PT Frequency: other (see comments) (5-14 x/wk)  Plan of Care Review  Plan Of Care Reviewed With: patient, family  Progress: improving  Outcome Summary/Follow up Plan: pt initially had severe LLE pain w/ sit to stand but improved pm to be able to walk; will need cont work on gait, RW, and stair training; if home before goals met, rec. HH PT          Outcome Measures       04/21/17 1500 04/21/17 1300 04/20/17 1038    How much help from another person do you currently need...    Turning from your back to your side while in flat bed without using bedrails? 2  -GB      Moving from lying on back to sitting on the side of a flat bed without bedrails? 2  -GB      Moving to and from a bed to a chair (including a wheelchair)? 2  -GB      Standing up from a chair using your arms (e.g., wheelchair, bedside chair)? 2  -GB      Climbing 3-5 steps with a railing? 2  -GB      To walk in hospital room? 3  -GB      AM-PAC 6 Clicks Score 13  -GB      How much help from another is currently needed...    Putting on and taking off regular lower body clothing?  2  -JH 2  -RB    Bathing (including washing, rinsing, and drying)  2  -JH 2  -RB    Toileting  (which includes using toilet bed pan or urinal)  2  -JH 2  -RB    Putting on and taking off regular upper body clothing  3  - 3  -RB    Taking care of personal grooming (such as brushing teeth)  3  - 3  -RB    Eating meals  4  - 4  -RB    Score  16  -JH 16  -RB    Functional Assessment    Outcome Measure Options AM-PAC 6 Clicks Basic Mobility (PT)  -GB  AM-[facility] 6 Clicks Daily Activity (OT)  -RB      04/19/17 1900          How much help from another person do you currently need...    Turning from your back to your side while in flat bed without using bedrails? 2  -GB      Moving from lying on back to sitting on the side of a flat bed without bedrails? 2  -GB      Moving to and from a bed to a chair (including a wheelchair)? 2  -GB      Standing up from a chair using your arms (e.g., wheelchair, bedside chair)? 2  -GB      Climbing 3-5 steps with a railing? 2  -GB      To walk in hospital room? 3  -GB      AM-PAC 6 Clicks Score 13  -GB      Functional Assessment    Outcome Measure Options AM-PAC 6 Clicks Basic Mobility (PT)  -GB        User Key  (r) = Recorded By, (t) = Taken By, (c) = Cosigned By    Initials Name Provider Type    RB Jn Hu, OT Occupational Therapist    CELESTINA Doe, PT Physical Therapist     PAULINO Morales/L Occupational Therapy Assistant           Time Calculation:         PT Charges       04/21/17 1517 04/21/17 1015       Time Calculation    Start Time 1345  -GB 1015  -GB     Stop Time 1430  -GB 1108  -GB     Time Calculation (min) 45 min  -GB 53 min  -GB     PT Non-Billable Time (min)  15 min   physican visit then return physician visit for LLE pain  -GB     PT Received On 04/21/17  -GB 04/21/17  -GB     Time Calculation- PT    Total Timed Code Minutes- PT 45 minute(s)  -GB 53 minute(s)  -GB       User Key  (r) = Recorded By, (t) = Taken By, (c) = Cosigned By    Initials Name Provider Type    CELESTINA Doe, PT Physical Therapist          Therapy  Charges for Today     Code Description Service Date Service Provider Modifiers Qty    21783718999 HC PT THERAPEUTIC ACT EA 15 MIN 4/21/2017 Estela Doe, PT GP 3     Duration:  38m (10:15 AM - 10:53 AM)      98034425838 HC PT THER SUPP EA 15 MIN 4/21/2017 Estela Doe PT GP 1     Duration:  15m (10:53 AM - 11:08 AM)      13311634541 HC PT MOBILITY CURRENT 4/21/2017 Estela Doe, PT GP, CK 1    56359211681 HC PT MOBILITY PROJECTED 4/21/2017 Estela Doe, PT GP, CI 1    64620563530 HC PT THER SUPP EA 15 MIN 4/21/2017 Estela Doe PT GP 1     Duration:  15m ( 2:30 PM -  2:45 PM)      42106450606 HC GAIT TRAINING EA 15 MIN 4/21/2017 Estela Doe PT GP 1     Duration:  15m ( 2:15 PM -  2:30 PM)      95927931244 HC PT THER PROC EA 15 MIN 4/21/2017 Estela Doe PT GP 2     Duration:  30m ( 1:45 PM -  2:15 PM)            PT G-Codes  PT Professional Judgement Used?: Yes  Outcome Measure Options: AM-PAC 6 Clicks Basic Mobility (PT)  Score:  (13)  Functional Limitation: Mobility: Walking and moving around  Mobility: Walking and Moving Around Current Status (): At least 40 percent but less than 60 percent impaired, limited or restricted  Mobility: Walking and Moving Around Goal Status (): At least 1 percent but less than 20 percent impaired, limited or restricted    Estela Doe PT  4/21/2017

## 2017-04-21 NOTE — PLAN OF CARE
Problem: Patient Care Overview (Adult)  Goal: Plan of Care Review  Outcome: Ongoing (interventions implemented as appropriate)  Encourage intake of meals and supplements.    04/21/17 1440   Coping/Psychosocial Response Interventions   Plan Of Care Reviewed With son   Patient Care Overview   Progress no change   Outcome Evaluation   Outcome Summary/Follow up Plan Intake 100% - 1x, 75% - 1x, 50% - 1x, 25% - 4x

## 2017-04-22 VITALS
HEART RATE: 83 BPM | BODY MASS INDEX: 22.77 KG/M2 | DIASTOLIC BLOOD PRESSURE: 71 MMHG | OXYGEN SATURATION: 97 % | RESPIRATION RATE: 18 BRPM | HEIGHT: 67 IN | TEMPERATURE: 98.3 F | SYSTOLIC BLOOD PRESSURE: 140 MMHG | WEIGHT: 145.06 LBS

## 2017-04-22 PROCEDURE — 97110 THERAPEUTIC EXERCISES: CPT

## 2017-04-22 PROCEDURE — 97535 SELF CARE MNGMENT TRAINING: CPT

## 2017-04-22 PROCEDURE — 94799 UNLISTED PULMONARY SVC/PX: CPT

## 2017-04-22 PROCEDURE — 97150 GROUP THERAPEUTIC PROCEDURES: CPT

## 2017-04-22 PROCEDURE — 97116 GAIT TRAINING THERAPY: CPT

## 2017-04-22 PROCEDURE — 94760 N-INVAS EAR/PLS OXIMETRY 1: CPT

## 2017-04-22 RX ORDER — AZITHROMYCIN 250 MG/1
TABLET, FILM COATED ORAL
Qty: 3 TABLET | Refills: 0 | Status: SHIPPED | OUTPATIENT
Start: 2017-04-22 | End: 2017-05-02

## 2017-04-22 RX ADMIN — IPRATROPIUM BROMIDE AND ALBUTEROL SULFATE 3 ML: 2.5; .5 SOLUTION RESPIRATORY (INHALATION) at 03:05

## 2017-04-22 RX ADMIN — DOCUSATE SODIUM 100 MG: 100 CAPSULE, LIQUID FILLED ORAL at 09:38

## 2017-04-22 RX ADMIN — PANTOPRAZOLE SODIUM 40 MG: 40 TABLET, DELAYED RELEASE ORAL at 09:38

## 2017-04-22 RX ADMIN — POTASSIUM CHLORIDE 20 MEQ: 750 CAPSULE, EXTENDED RELEASE ORAL at 09:38

## 2017-04-22 RX ADMIN — IPRATROPIUM BROMIDE AND ALBUTEROL SULFATE 3 ML: 2.5; .5 SOLUTION RESPIRATORY (INHALATION) at 11:06

## 2017-04-22 RX ADMIN — IPRATROPIUM BROMIDE AND ALBUTEROL SULFATE 3 ML: 2.5; .5 SOLUTION RESPIRATORY (INHALATION) at 07:36

## 2017-04-22 RX ADMIN — DUTASTERIDE 0.5 MG: 0.5 CAPSULE ORAL at 09:38

## 2017-04-22 RX ADMIN — INDAPAMIDE 2.5 MG: 2.5 TABLET, FILM COATED ORAL at 09:38

## 2017-04-22 RX ADMIN — METOPROLOL SUCCINATE 50 MG: 50 TABLET, EXTENDED RELEASE ORAL at 09:38

## 2017-04-22 NOTE — THERAPY DISCHARGE NOTE
Acute Care - Occupational Therapy Discharge Summary  [facility]     Patient Name: Sterling Graves  : 1930  MRN: [id number]    Today's Date: 2017  Onset of Illness/Injury or Date of Surgery Date: 17    Date of Referral to OT: 17  Referring Physician: LEONIE Chavez      Admit Date: 2017        OT Recommendation and Plan    Visit Dx:    ICD-10-CM ICD-9-CM   1. Pneumonia of right lower lobe due to infectious organism J18.9 483.8   2. Dehydration E86.0 276.51   3. Abnormality of gait and mobility R26.9 781.2   4. Impaired mobility and activities of daily living Z74.09 799.89                     OT Goals       17 1622 17 1300 17 1322    Transfer Training OT LTG    Transfer Training OT LTG, Date Established   17  -    Transfer Training OT LTG, Time to Achieve   by discharge  -    Transfer Training OT LTG, Activity Type   all transfers  -    Transfer Training OT LTG, [place] Level   supervision required  -    Transfer Training OT LTG, Assist Device   walker, rolling;cane, straight  -    Transfer Training OT LTG, Date Goal Reviewed 17  - 17  -     Transfer Training OT LTG, Outcome goal not met  - goal ongoing  -     Transfer Training OT LTG, Reason Goal Not Met discharged from facility  -      Static Standing Balance OT STG    Static Standing Balance OT STG, Date Established   17  -    Static Standing Balance OT STG, Time to Achieve   5 - 7 days  -    Static Standing Balance OT STG, [place] Level   supervision   5 minutes with 0-1 rest break.  -    Static Standing Balance OT STG, Assist Device   assistive device   Rolling walker or cane.  -    Static Standing Balance OT STG, Date Goal Reviewed 17  - 17  -     Static Standing Balance OT STG, Outcome goal not met  - goal ongoing  -     Static Standing Balance OT STG, Reason Goal Not Met discharged from facility  -      Patient  Education OT STG    Patient Education OT STG, Date Established   04/20/17  -    Patient Education OT STG, Time to Achieve   5 days  -    Patient Education OT STG, Education Type   home safety;work simplification;energy conservation  -    Patient Education OT STG, Education Understanding   demonstrates adequately;verbalizes understanding  -    Patient Education OT STG, Date Goal Reviewed 04/22/17  - 04/21/17  -     Patient Education OT STG Outcome goal not met  - goal ongoing  -     Patient Education OT STG, Reason Goal Not Met discharged from facility  -      ADL OT LTG    ADL OT LTG, Date Established   04/20/17  -    ADL OT LTG, Time to Achieve   by discharge  -    ADL OT LTG, Activity Type   ADL skills   Sponge bath and dress.  -    ADL OT LTG, [place] Level   standby assist;assistive device   Strenght cane or rolling walker.  -    ADL OT LTG, Date Goal Reviewed 04/22/17  - 04/21/17  -     ADL OT LTG, Outcome goal not met  - goal ongoing  -     ADL OT LTG, Reason Goal Not Met discharged from facility  -        User Key  (r) = Recorded By, (t) = Taken By, (c) = Cosigned By    Initials Name Provider Type    SOO Hu, OT Occupational Therapist     REJI Lizarraga/L Occupational Therapist     PAULINO Morales/L Occupational Therapy Assistant                Outcome Measures       04/22/17 1010 04/21/17 1500 04/21/17 1300    How much help from another person do you currently need...    Turning from your back to your side while in flat bed without using bedrails? 3  -AM 2  -GB     Moving from lying on back to sitting on the side of a flat bed without bedrails? 3  -AM 2  -GB     Moving to and from a bed to a chair (including a wheelchair)? 3  -AM 2  -GB     Standing up from a chair using your arms (e.g., wheelchair, bedside chair)? 3  -AM 2  -GB     Climbing 3-5 steps with a railing? 1  -AM 2  -GB     To walk in hospital room? 3  -AM 3  -GB     AM-PAC 6 Clicks  Score 16  -AM 13  -GB     How much help from another is currently needed...    Putting on and taking off regular lower body clothing?   2  -JH    Bathing (including washing, rinsing, and drying)   2  -JH    Toileting (which includes using toilet bed pan or urinal)   2  -JH    Putting on and taking off regular upper body clothing   3  -JH    Taking care of personal grooming (such as brushing teeth)   3  -JH    Eating meals   4  -JH    Score   16  -JH    Functional Assessment    Outcome Measure Options AM-PAC 6 Clicks Basic Mobility (PT)  -AM AM-PAC 6 Clicks Basic Mobility (PT)  -GB       04/20/17 1038 04/19/17 1900       How much help from another person do you currently need...    Turning from your back to your side while in flat bed without using bedrails?  2  -GB     Moving from lying on back to sitting on the side of a flat bed without bedrails?  2  -GB     Moving to and from a bed to a chair (including a wheelchair)?  2  -GB     Standing up from a chair using your arms (e.g., wheelchair, bedside chair)?  2  -GB     Climbing 3-5 steps with a railing?  2  -GB     To walk in hospital room?  3  -GB     AM-PAC 6 Clicks Score  13  -GB     How much help from another is currently needed...    Putting on and taking off regular lower body clothing? 2  -RB      Bathing (including washing, rinsing, and drying) 2  -RB      Toileting (which includes using toilet bed pan or urinal) 2  -RB      Putting on and taking off regular upper body clothing 3  -RB      Taking care of personal grooming (such as brushing teeth) 3  -RB      Eating meals 4  -RB      Score 16  -RB      Functional Assessment    Outcome Measure Options AM-PAC 6 Clicks Daily Activity (OT)  -RB AM-PAC 6 Clicks Basic Mobility (PT)  -GB       User Key  (r) = Recorded By, (t) = Taken By, (c) = Cosigned By    Initials Name Provider Type    SOO Hu, OT Occupational Therapist    CELESTINA Doe, PT Physical Therapist    AUDREY Alejandra, PTA  Physical Therapy Assistant    PAULINO Roque/L Occupational Therapy Assistant              OT Discharge Summary  Anticipated Discharge Disposition: home with home health  Reason for Discharge: Discharge from facility, Per MD order  Outcomes Achieved: Refer to plan of care for updates on goals achieved  Discharge Destination: Home      Diya Sanders OTR/LOGAN  4/22/2017

## 2017-04-22 NOTE — PLAN OF CARE
Problem: Inpatient Occupational Therapy  Goal: Transfer Training Goal 1 LTG- OT  Outcome: Unable to achieve outcome(s) by discharge Date Met:  04/22/17 04/20/17 1322 04/22/17 1622   Transfer Training OT LTG   Transfer Training OT LTG, Date Established 04/20/17 --    Transfer Training OT LTG, Time to Achieve by discharge --    Transfer Training OT LTG, Activity Type all transfers --    Transfer Training OT LTG, [place] Level supervision required --    Transfer Training OT LTG, Assist Device walker, rolling;cane, straight --    Transfer Training OT LTG, Date Goal Reviewed --  04/22/17   Transfer Training OT LTG, Outcome --  goal not met   Transfer Training OT LTG, Reason Goal Not Met --  discharged from facility       Goal: Static Standing Balance Goal OT- STG  Outcome: Unable to achieve outcome(s) by discharge Date Met:  04/22/17 04/20/17 1322 04/22/17 1622   Static Standing Balance OT STG   Static Standing Balance OT STG, Date Established 04/20/17 --    Static Standing Balance OT STG, Time to Achieve 5 - 7 days --    Static Standing Balance OT STG, [place] Level supervision  (5 minutes with 0-1 rest break.) --    Static Standing Balance OT STG, Assist Device assistive device  (Rolling walker or cane.) --    Static Standing Balance OT STG, Date Goal Reviewed --  04/22/17   Static Standing Balance OT STG, Outcome --  goal not met   Static Standing Balance OT STG, Reason Goal Not Met --  discharged from facility       Goal: Patient Education Goal STG- OT  Outcome: Unable to achieve outcome(s) by discharge Date Met:  04/22/17 04/20/17 1322 04/22/17 1622   Patient Education OT STG   Patient Education OT STG, Date Established 04/20/17 --    Patient Education OT STG, Time to Achieve 5 days --    Patient Education OT STG, Education Type home safety;work simplification;energy conservation --    Patient Education OT STG, Education Understanding demonstrates adequately;verbalizes understanding --    Patient  Education OT STG, Date Goal Reviewed --  04/22/17   Patient Education OT STG Outcome --  goal not met   Patient Education OT STG, Reason Goal Not Met --  discharged from facility       Goal: ADL Goal LTG- OT  Outcome: Unable to achieve outcome(s) by discharge Date Met:  04/22/17 04/20/17 1322 04/22/17 1622   ADL OT LTG   ADL OT LTG, Date Established 04/20/17 --    ADL OT LTG, Time to Achieve by discharge --    ADL OT LTG, Activity Type ADL skills  (Sponge bath and dress.) --    ADL OT LTG, [place] Level standby assist;assistive device  (Strenght cane or rolling walker.) --    ADL OT LTG, Date Goal Reviewed --  04/22/17   ADL OT LTG, Outcome --  goal not met   ADL OT LTG, Reason Goal Not Met --  discharged from facility

## 2017-04-22 NOTE — PLAN OF CARE
Problem: Patient Care Overview (Adult)  Goal: Plan of Care Review  Outcome: Outcome(s) achieved Date Met:  04/22/17 04/22/17 1010   Coping/Psychosocial Response Interventions   Plan Of Care Reviewed With patient;manda   Patient Care Overview   Progress progress toward functional goals as expected   Outcome Evaluation   Outcome Summary/Follow up Plan Pt met 1/5 PT goals prior to d/c. At time of d/c - SBA for bed mob, CGA for gait and transfers. Pt d/c to home w/HH and family to assist.        Goal: Adult Individualization and Mutuality  Outcome: Outcome(s) achieved Date Met:  04/22/17  Goal: Discharge Needs Assessment  Outcome: Outcome(s) achieved Date Met:  04/22/17 04/22/17 1010   Discharge Needs Assessment   Concerns To Be Addressed discharge planning concerns   Readmission Within The Last 30 Days no previous admission in last 30 days   Equipment Needed After Discharge walker, rolling;commode   Discharge Facility/Level Of Care Needs home with home health   Current Discharge Risk physical impairment   Current Health   Anticipated Changes Related to Illness inability to care for self   Self-Care   Equipment Currently Used at Home cane, straight   Living Environment   Transportation Available family or friend will provide         Problem: Inpatient Physical Therapy  Goal: Bed Mobility Goal STG- PT  Outcome: Unable to achieve outcome(s) by discharge Date Met:  04/22/17 04/19/17 1851 04/22/17 1010   Bed Mobility PT STG   Bed Mobility PT STG, Date Established 04/19/17 --    Bed Mobility PT STG, Time to Achieve by discharge --    Bed Mobility PT STG, Activity Type all bed mobility --    Bed Mobility PT STG, [place] Level independent --    Bed Mobility PT STG, Additional Goal able to rotate each direction in bed and complete sup-sit-sup w/o c/o neck pain --    Bed Mobility PT STG, Date Goal Reviewed --  04/22/17   Bed Mobility PT STG, Outcome --  goal not met   Bed Mobility PT STG, Reason Goal Not Met --   discharged from facility       Goal: Transfer Training Goal 1 LTG- PT  Outcome: Unable to achieve outcome(s) by discharge Date Met:  04/22/17 04/19/17 1851 04/22/17 1010   Transfer Training PT LTG   Transfer Training PT LTG, Date Established 04/19/17 --    Transfer Training PT LTG, Time to Achieve by discharge --    Transfer Training PT LTG, Activity Type all transfers --    Transfer Training PT LTG, [place] Level conditional independence --    Transfer Training PT LTG, Assist Device other (see comments);cane, straight  (or FWRW) --    Transfer Training PT LTG, Date Goal Reviewed --  04/22/17   Transfer Training PT LTG, Outcome --  goal not met   Transfer Training PT LTG, Reason Goal Not Met --  discharged from facility       Goal: Gait Training Goal LTG- PT  Outcome: Unable to achieve outcome(s) by discharge Date Met:  04/22/17 04/19/17 1851 04/21/17 1507 04/22/17 1010   Gait Training PT LTG   Gait Training Goal PT LTG, Date Established 04/19/17 --  --    Gait Training Goal PT LTG, Time to Achieve by discharge --  --    Gait Training Goal PT LTG, [place] Level conditional independence --  --    Gait Training Goal PT LTG, Assist Device --  walker, rolling --    Gait Training Goal PT LTG, Distance to Achieve 600 --  --    Gait Training Goal PT LTG, Additional Goal 1000 --  --    Gait Training Goal PT LTG, Date Goal Reviewed --  --  04/22/17   Gait Training Goal PT LTG, Outcome --  --  goal not met   Gait Training Goal PT LTG, Reason Goal Not Met --  --  discharged from facility       Goal: Stair Training Goal LTG- PT  Outcome: Unable to achieve outcome(s) by discharge Date Met:  04/22/17 04/19/17 1851 04/22/17 1010   Stair Training PT LTG   Stair Training Goal PT LTG, Date Established 04/19/17 --    Stair Training Goal PT LTG, Time to Achieve by discharge --    Stair Training Goal PT LTG, Number of Steps up/down flight of steps indep --    Stair Training Goal PT LTG, [place] Level conditional  independence;independent --    Stair Training Goal PT LTG, Date Goal Reviewed --  04/22/17   Stair Training Goal PT LTG, Outcome --  goal not met   Stair Training Goal PT LTG, Reason Goal Not Met --  discharged from facility       Goal: Cardiopulmonary Goal STG- PT  Outcome: Outcome(s) achieved Date Met:  04/22/17 04/19/17 1851 04/22/17 1010   Cardiopulmonary PT STG   Cardiopulmonary PT STG, Date Established 04/19/17 --    Cardiopulmonary PT STG, Time to Achieve 3 days --    Cardiopulmonary PT STG, Additional Goal VSS w/ mobility, sats 91% or above --    Cardiopulmonary PT STG, Date Goal Reviewed --  04/22/17   Cardiopulmonary PT STG, Outcome --  goal met

## 2017-04-22 NOTE — DISCHARGE SUMMARY
[facility] Medicine Services  DISCHARGE SUMMARY       Date of Admission: 4/18/2017  Date of Discharge:  4/22/2017  Primary Care Physician: Augustin Jones MD    Presenting Problem/History of Present Illness:  86-year-old male with history of hypertension, BPH and GERD who was seen at emergency department due to questionable abdominal pain associated with nausea, vomiting, fever, chills and fatigue.  He was initially seen at the office of his family doctor and referred to the emergency department.  In the emergency room he was noted to be hemodynamically stable.  CT scan of the abdomen and pelvis was done and showed evidence of pneumonia as well as a pre-existing prostate nodule.  Blood work showed an unremarkable CBC, slightly elevated BUN of 28 and creatinine of 0.98.       Final Discharge Diagnoses:  Hospital Problem List     Pneumonia    Dehydration          Consults:   Consults     Date and Time Order Name Status Description    4/18/2017 1831 Hospitalist (on-call MD unless specified)            Procedures Performed: CT scan of the abdomen and pelvis.                Pertinent Test Results: As dictated above.    Chief Complaint on Day of Discharge:   None.    Hospital Course:  The patient patient was admitted and started on antibiotics, intravenous fluids, nebulizers, supplemental oxygen and his outpatient medications. .    He did improve and prior to discharge was asymptomatic.  His hospital course was complicated by deconditioning requiring physical therapy.  This did resolve before discharge.  Patient was also noted to have a prostate nodule on the CT scan.  According to family members, this is being followed by the urologist Dr. Greene.  The IV fluids was also discontinued before discharge and the patient's electrolytes did normalize.  Blood cultures were drawn and were unremarkable..    Condition on Discharge:  Stable and improved.    Physical Exam on Discharge:  BP  "129/67 (BP Location: Left arm, Patient Position: Lying)  Pulse 86  Temp 98.3 °F (36.8 °C)  Resp 18  Ht 67.01\" (170.2 cm)  Wt 145 lb 1 oz (65.8 kg)  SpO2 94%  BMI 22.71 kg/m2  Physical Exam   Constitutional: He is oriented to person, place, and time.   Cardiovascular: Normal rate, regular rhythm, normal heart sounds and intact distal pulses.  Exam reveals no gallop.    No murmur heard.  Pulmonary/Chest: Effort normal and breath sounds normal. He has no wheezes. He has no rales.   Abdominal: Soft. Bowel sounds are normal. He exhibits no distension. There is no tenderness.   Musculoskeletal: He exhibits no edema.   Neurological: He is alert and oriented to person, place, and time.         Discharge Disposition:  Home or Self Care    Discharge Medications:   Sterling Graves   Home Medication Instructions CARLIE:[id number]    Printed on:04/22/17 1544   Medication Information                      dutasteride (AVODART) 0.5 MG capsule  Take 1 capsule by mouth Daily.             esomeprazole (nexIUM) 40 MG capsule  Take 1 capsule by mouth Daily.             indapamide (LOZOL) 2.5 MG tablet  Take 1 tablet by mouth Daily.             metoprolol succinate XL (TOPROL-XL) 50 MG 24 hr tablet  Take 1 tablet by mouth Daily.             potassium chloride (K-DUR,KLOR-CON) 20 MEQ CR tablet  Take 1 tablet by mouth Daily.             vitamin D (ERGOCALCIFEROL) 77371 UNITS capsule capsule  Take 1 capsule by mouth Every 30 (Thirty) Days.                 Discharge Diet:  heart healthy.    Activity at Discharge:  as tolerated.    Discharge Care Plan/Instructions:   Take medications as prescribed.  Home health referral has been initiated.    Follow-up Appointments:   Future Appointments  Date Time Provider Department Center   10/9/2017 7:45 AM Augustin Jones MD [facility] None       Test Results Pending at Discharge:  Order Current Status    Blood Culture Preliminary result    Blood Culture Preliminary result          Ahsan" GLENIS Brunner MD  04/22/17  11:26 AM    Time: 55 minutes.

## 2017-04-22 NOTE — THERAPY DISCHARGE NOTE
Acute Care - Physical Therapy Treatment Note/Discharge  [facility]     Patient Name: Sterling Graves  : 1930  MRN: [id number]  Today's Date: 2017  Onset of Illness/Injury or Date of Surgery Date: 17  Date of Referral to PT: 17  Referring Physician: LEONIE Chavez    Admit Date: 2017    Visit Dx:    ICD-10-CM ICD-9-CM   1. Pneumonia of right lower lobe due to infectious organism J18.9 483.8   2. Dehydration E86.0 276.51   3. Abnormality of gait and mobility R26.9 781.2   4. Impaired mobility and activities of daily living Z74.09 799.89     Patient Active Problem List   Diagnosis   • Essential hypertension   • Eczema   • Benign prostatic hyperplasia   • Vertigo   • Post-traumatic osteoarthritis of left knee   • Post-traumatic osteoarthritis of left hip   • Gastroesophageal reflux disease without esophagitis   • Vitamin D deficiency   • Pneumonia   • Dehydration       Physical Therapy Education     Title: PT OT SLP Therapies (Done)     Topic: Physical Therapy (Resolved)     Point: Mobility training (Resolved)    Learning Progress Summary    Learner Readiness Method Response Comment Documented by Status   Patient Acceptance D,E TERESA FISH,NR instructed on concern for LLE soreness; POC; accepted instruction well; suggested HH PT for steps; instructed family if pt at home, to provide assist at home for all mobility until reliably cleared for indep in gait, particularly with stairs to bedroom  17 [id number] Done    Acceptance E,D NR discussed POC, mobility instructions, no OOB w/out assist; notified CNA re: pt c/o neck pain w position change; instructed granddaughter he may need assist at home post d/c;  17 6276 Active   Family Acceptance D,E TERESA FISH,NR instructed on concern for LLE soreness; POC; accepted instruction well; suggested HH PT for steps; instructed family if pt at home, to provide assist at home for all mobility until reliably cleared for indep in gait,  particularly with stairs to bedroom  04/21/17 1457 Done    Acceptance E,D NR discussed POC, mobility instructions, no OOB w/out assist; notified CNA re: pt c/o neck pain w position change; instructed granddaughter he may need assist at home post d/c;  04/19/17 1848 Active               Point: Home exercise program (Resolved)    Learning Progress Summary    Learner Readiness Method Response Comment Documented by Status   Patient Acceptance D,E TERESA FISHNR instructed on concern for LLE soreness; POC; accepted instruction well; suggested HH PT for steps; instructed family if pt at home, to provide assist at home for all mobility until reliably cleared for indep in gait, particularly with stairs to bedroom  04/21/17 1457 Done    Acceptance ED NR discussed POC, mobility instructions, no OOB w/out assist; notified CNA re: pt c/o neck pain w position change; instructed granddaughter he may need assist at home post d/c;  04/19/17 1848 Active   Family Acceptance D,E TERESA FISHNR instructed on concern for LLE soreness; POC; accepted instruction well; suggested HH PT for steps; instructed family if pt at home, to provide assist at home for all mobility until reliably cleared for indep in gait, particularly with stairs to bedroom  04/21/17 1457 Done    Acceptance ED NR discussed POC, mobility instructions, no OOB w/out assist; notified CNA re: pt c/o neck pain w position change; instructed granddaughter he may need assist at home post d/c;  04/19/17 1848 Active               Point: Precautions (Resolved)    Learning Progress Summary    Learner Readiness Method Response Comment Documented by Status   Patient Acceptance D,E TERESA FISH,NR instructed on concern for LLE soreness; POC; accepted instruction well; suggested HH PT for steps; instructed family if pt at home, to provide assist at home for all mobility until reliably cleared for indep in gait, particularly with stairs to bedroom  04/21/17 1457 Done    Acceptance ERAJ NR  discussed POC, mobility instructions, no OOB w/out assist; notified CNA re: pt c/o neck pain w position change; instructed granddaughter he may need assist at home post d/c;  04/19/17 1848 Active   Family Acceptance D,E ABE,TERESA,NR instructed on concern for LLE soreness; POC; accepted instruction well; suggested  PT for steps; instructed family if pt at home, to provide assist at home for all mobility until reliably cleared for indep in gait, particularly with stairs to bedroom  04/21/17 1457 Done    Acceptance E,D NR discussed POC, mobility instructions, no OOB w/out assist; notified CNA re: pt c/o neck pain w position change; instructed granddaughter he may need assist at home post d/c;  04/19/17 1848 Active                      User Key     Initials Effective Dates Name Provider Type Discipline     10/17/16 -  Estela Doe, PT Physical Therapist PT                    IP PT Goals       04/22/17 1010 04/21/17 1507 04/21/17 1502    Bed Mobility PT STG    Bed Mobility PT STG, Date Goal Reviewed 04/22/17  -AM      Bed Mobility PT STG, Outcome goal not met  -AM      Bed Mobility PT STG, Reason Goal Not Met discharged from facility  -AM      Transfer Training PT LTG    Transfer Training PT  LTG, Date Goal Reviewed 04/22/17  -AM      Transfer Training PT LTG, Outcome goal not met  -AM      Transfer Training PT LTG, Reason Goal Not Met discharged from facility  -AM      Gait Training PT LTG    Gait Training Goal PT LTG, Assist Device  walker, rolling  -GB     Gait Training Goal PT LTG, Date Goal Reviewed 04/22/17  -AM      Gait Training Goal PT LTG, Outcome goal not met  -AM      Gait Training Goal PT LTG, Reason Goal Not Met discharged from facility  -AM      Stair Training PT LTG    Stair Training Goal PT LTG, Date Goal Reviewed 04/22/17  -AM      Stair Training Goal PT LTG, Outcome goal not met  -AM      Stair Training Goal PT LTG, Reason Goal Not Met discharged from facility  -AM progress slower  than expected   participating better now  -GB (P)  medical status inhibits participation;unable to make needed progress  -GB    Cardiopulmonary PT STG    Cardiopulmonary PT STG, Date Goal Reviewed 04/22/17  -AM      Cardiopulmonary PT STG, Outcome goal met  -AM  (P)  goal met  -GB      04/19/17 1851          Bed Mobility PT STG    Bed Mobility PT STG, Date Established 04/19/17  -GB      Bed Mobility PT STG, Time to Achieve by discharge  -GB      Bed Mobility PT STG, Activity Type all bed mobility  -GB      Bed Mobility PT STG, [place] Level independent  -GB      Bed Mobility PT STG, Additional Goal able to rotate each direction in bed and complete sup-sit-sup w/o c/o neck pain  -GB      Bed Mobility PT STG, Outcome goal not met  -GB      Transfer Training PT LTG    Transfer Training PT LTG, Date Established 04/19/17  -      Transfer Training PT LTG, Time to Achieve by discharge  -GB      Transfer Training PT LTG, Activity Type all transfers  -GB      Transfer Training PT LTG, [place] Level conditional independence  -GB      Transfer Training PT LTG, Assist Device other (see comments);cane, straight   or FWRW  -GB      Transfer Training PT LTG, Outcome goal not met  -GB      Gait Training PT LTG    Gait Training Goal PT LTG, Date Established 04/19/17  -GB      Gait Training Goal PT LTG, Time to Achieve by discharge  -GB      Gait Training Goal PT LTG, [place] Level conditional independence  -GB      Gait Training Goal PT LTG, Assist Device cane, straight   or fixed wheel rolling walker  -GB      Gait Training Goal PT LTG, Distance to Achieve 600  -GB      Gait Training Goal PT LTG, Additional Goal 1000  -GB      Gait Training Goal PT LTG, Outcome goal not met  -GB      Stair Training PT LTG    Stair Training Goal PT LTG, Date Established 04/19/17  -GB      Stair Training Goal PT LTG, Time to Achieve by discharge  -GB      Stair Training Goal PT LTG, Number of Steps up/down flight of steps indep  -GB       Stair Training Goal PT LTG, [place] Level conditional independence;independent  -GB      Stair Training Goal PT LTG, Outcome goal not met  -GB      Cardiopulmonary PT STG    Cardiopulmonary PT STG, Date Established 04/19/17  -GB      Cardiopulmonary PT STG, Time to Achieve 3 days  -GB      Cardiopulmonary PT STG, Additional Goal VSS w/ mobility, sats 91% or above  -GB      Cardiopulmonary PT STG, Outcome goal not met  -GB        User Key  (r) = Recorded By, (t) = Taken By, (c) = Cosigned By    Initials Name Provider Type    CELESTINA Doe, PT Physical Therapist    AM Fidencio Alejandra PTA Physical Therapy Assistant              Adult Rehabilitation Note       04/22/17 1010 04/21/17 1300       Rehab Assessment/Intervention    Discipline physical therapy assistant  - occupational therapy assistant  -     Document Type therapy note (daily note)  -AM therapy note (daily note)  -     Subjective Information agree to therapy;no complaints  -AM agree to therapy  -JH     Patient Effort, Rehab Treatment good  -AM fair  -     Symptoms Noted During/After Treatment none  -AM      Precautions/Limitations fall precautions  -AM      Equipment Issued to Patient gait belt  -AM      Recorded by [AM] Fidencio Alejandra PTA [] Mary Wheatley, BOB/L     Vital Signs    Pre Systolic BP Rehab 135  -AM      Pre Treatment Diastolic BP 69  -AM      Post Systolic BP Rehab 117  -AM      Post Treatment Diastolic BP 67  -AM      Pretreatment Heart Rate (beats/min) 92  -AM 72  -JH     Posttreatment Heart Rate (beats/min) 94  -AM      Pre SpO2 (%) 98  -AM 99  -JH     O2 Delivery Pre Treatment room air  -AM room air  -     Post SpO2 (%) 99  -AM      O2 Delivery Post Treatment room air  -AM      Pre Patient Position Supine  -AM      Intra Patient Position Standing  -AM      Post Patient Position Sitting  -AM      Recorded by [AM] Fidencio Alejandra PTA [] RADHA MoralesA/L     Pain Assessment    Pain Assessment  No/denies pain  -AM 0-10  -     Pain Score  5  -     Post Pain Score  6  -     Pain Type  Acute pain   Pt state he has been in pain since attempting to stand  -     Pain Location  Hip  -     Pain Orientation  Left  -     Recorded by [AM] Fidencio Alejandra PTA [] PAULINO Morales/L     Cognitive Assessment/Intervention    Current Cognitive/Communication Assessment functional  -AM functional  -     Orientation Status oriented x 4  -AM oriented x 4  -     Follows Commands/Answers Questions 100% of the time  -% of the time  -     Personal Safety Interventions gait belt;nonskid shoes/slippers when out of bed;supervised activity  -AM      Recorded by [AM] Fidencio Alejandra PTA [] PAULINO Morales/L     ROM (Range of Motion)    General ROM no range of motion deficits identified  -AM      Recorded by [AM] Fidencio Alejandra PTA      Bed Mobility, Assessment/Treatment    Bed Mobility, Roll Left, [place] not tested  -AM      Bed Mobility, Roll Right, [place] not tested  -AM      Bed Mobility, Scoot/Bridge, [place] not tested  -AM      Bed Mob, Supine to Sit, [place] supervision required  -AM      Bed Mob, Sit to Supine, [place] not tested  -AM      Bed Mob, Sidelying to Sit, [place] not tested  -AM      Bed Mob, Sit to Sidelying, [place] not tested  -AM      Bed Mobility, Safety Issues decreased use of arms for pushing/pulling;decreased use of legs for bridging/pushing  -AM      Bed Mobility, Impairments strength decreased  -AM      Recorded by [AM] Fidencio Alejandra PTA      Transfer Assessment/Treatment    Transfers, Bed-Chair [place] contact guard assist  -AM      Transfers, Chair-Bed [place] contact guard assist  -AM      Transfers, Bed-Chair-Bed, Assist Device rolling walker  -AM      Transfers, Sit-Stand [place] contact guard assist  -AM      Transfers, Stand-Sit [place] contact guard assist  -AM      Transfers, Sit-Stand-Sit, Assist  Device rolling walker  -AM      Toilet Transfer, [place] contact guard assist  -AM      Toilet Transfer, Assistive Device rolling walker  -AM      Walk-In Shower Transfer, [place] not tested  -AM      Bathtub Transfer, [place] not tested  -AM      Transfer, Maintain Weight Bearing Status able to maintain weight bearing status  -AM      Transfer, Safety Issues step length decreased  -AM      Transfer, Impairments strength decreased  -AM      Transfer, Comment  Pt decline to EOB/OOB 2' L Hip pain  -JH     Recorded by [AM] Fidencio Alejandra PTA [] PAULINO Morales/L     Gait Assessment/Treatment    Gait, [place] Level contact guard assist  -AM      Gait, Assistive Device rolling walker  -AM      Gait, Distance (Feet) 300  -AM      Gait, Gait Pattern Analysis swing-through gait  -AM      Gait, Gait Deviations bilateral:;wilfredo decreased  -AM      Gait, Maintain Weight Bearing Status able to maintain weight bearing status  -AM      Gait, Safety Issues step length decreased  -AM      Gait, Impairments strength decreased  -AM      Recorded by [AM] Fidencio Alejandra PTA      Stairs Assessment/Treatment    Stairs, [place] Level not tested  -AM      Recorded by [AM] Fidencio Alejandra PTA      Upper Body Bathing Assessment/Training    UB Bathing Assess/Train, Comment  Pt report he has had a bath  -JH     Recorded by  [JH] PAULINO Morales/L     Therapy Exercises    LLE Resistance --  -AM      Bilateral Lower Extremities --  -AM      Bilateral Upper Extremity  AROM:;10 reps;supine;elbow flexion/extension;hand pumps;pronation/supination;shoulder abduction/adduction;shoulder extension/flexion  -JH     BUE Resistance  manual resistance- minimal  -JH     Recorded by [AM] Fidencio Alejandra PTA [] PAULINO Morales/L     Positioning and Restraints    Pre-Treatment Position in bed  -AM in bed  -JH     Post Treatment Position chair  -AM bed  -JH     In Bed  supine;call light within reach;encouraged  to call for assist;exit alarm on;with family/caregiver  -     In Chair reclined;call light within reach;encouraged to call for assist;with family/caregiver;legs elevated  -AM      Recorded by [AM] Fidencio Alejandra PTA [] SEGUNDO Morales       User Key  (r) = Recorded By, (t) = Taken By, (c) = Cosigned By    Initials Name Effective Dates    AM Fidencio Alejandra PTA 10/17/16 -      SEGUNDO Morales 10/17/16 -           PT Recommendation and Plan  Anticipated Equipment Needs At Discharge: front wheeled walker  Anticipated Discharge Disposition: home with home health, home with assist  Planned Therapy Interventions: balance training, bed mobility training, gait training, patient/family education, ROM (Range of Motion), stair training, transfer training  PT Frequency: other (see comments) (5-14 x/wk)  Plan of Care Review  Plan Of Care Reviewed With: patient, son  Progress: progress toward functional goals as expected  Outcome Summary/Follow up Plan: Pt met 1/5 PT goals prior to d/c. At time of d/c - SBA for bed mob, CGA for gait and transfers. Pt d/c to home w/HH and family to assist.           Outcome Measures       04/22/17 1010 04/21/17 1500 04/21/17 1300    How much help from another person do you currently need...    Turning from your back to your side while in flat bed without using bedrails? 3  -AM 2  -GB     Moving from lying on back to sitting on the side of a flat bed without bedrails? 3  -AM 2  -GB     Moving to and from a bed to a chair (including a wheelchair)? 3  -AM 2  -GB     Standing up from a chair using your arms (e.g., wheelchair, bedside chair)? 3  -AM 2  -GB     Climbing 3-5 steps with a railing? 1  -AM 2  -GB     To walk in hospital room? 3  -AM 3  -GB     AM-PAC 6 Clicks Score 16  -AM 13  -GB     How much help from another is currently needed...    Putting on and taking off regular lower body clothing?   2  -JH    Bathing (including washing, rinsing, and drying)   2  -     Toileting (which includes using toilet bed pan or urinal)   2  -JH    Putting on and taking off regular upper body clothing   3  -JH    Taking care of personal grooming (such as brushing teeth)   3  -JH    Eating meals   4  -    Score   16  -    Functional Assessment    Outcome Measure Options AM-PAC 6 Clicks Basic Mobility (PT)  -AM AM-PAC 6 Clicks Basic Mobility (PT)  -GB       04/20/17 1038 04/19/17 1900       How much help from another person do you currently need...    Turning from your back to your side while in flat bed without using bedrails?  2  -GB     Moving from lying on back to sitting on the side of a flat bed without bedrails?  2  -GB     Moving to and from a bed to a chair (including a wheelchair)?  2  -GB     Standing up from a chair using your arms (e.g., wheelchair, bedside chair)?  2  -GB     Climbing 3-5 steps with a railing?  2  -GB     To walk in hospital room?  3  -GB     AM-PAC 6 Clicks Score  13  -GB     How much help from another is currently needed...    Putting on and taking off regular lower body clothing? 2  -RB      Bathing (including washing, rinsing, and drying) 2  -RB      Toileting (which includes using toilet bed pan or urinal) 2  -RB      Putting on and taking off regular upper body clothing 3  -RB      Taking care of personal grooming (such as brushing teeth) 3  -RB      Eating meals 4  -RB      Score 16  -RB      Functional Assessment    Outcome Measure Options AM-PAC 6 Clicks Daily Activity (OT)  -RB AM-PAC 6 Clicks Basic Mobility (PT)  -GB       User Key  (r) = Recorded By, (t) = Taken By, (c) = Cosigned By    Initials Name Provider Type    SOO uH, OT Occupational Therapist    CELESTINA Doe, PT Physical Therapist    AUDREY Alejandra PTA Physical Therapy Assistant    SEGUNDO Roque Occupational Therapy Assistant           Time Calculation:         PT Charges       04/22/17 1209          Time Calculation    Start Time 1010  -AM       Stop Time 1050  -AM      Time Calculation (min) 40 min  -AM      PT Received On 04/22/17  -AM      Time Calculation- PT    Total Timed Code Minutes- PT 40 minute(s)  -AM        User Key  (r) = Recorded By, (t) = Taken By, (c) = Cosigned By    Initials Name Provider Type    AM Fidencio Alejandra PTA Physical Therapy Assistant          Therapy Charges for Today     Code Description Service Date Service Provider Modifiers Qty    74728810044 HC GAIT TRAINING EA 15 MIN 4/22/2017 Fidencio Alejandra PTA GP 1    15548980473 HC PT THER PROC GROUP 4/22/2017 Fidencio Alejandra PTA GP 1    51039102486 HC PT SELF CARE/MGMT/TRAIN EA 15 MIN 4/22/2017 Fidencio Alejandra PTA GP 1          PT G-Codes  PT Professional Judgement Used?: Yes  Outcome Measure Options: AM-PAC 6 Clicks Basic Mobility (PT)  Score:  (13)  Functional Limitation: Mobility: Walking and moving around  Mobility: Walking and Moving Around Current Status (): At least 40 percent but less than 60 percent impaired, limited or restricted  Mobility: Walking and Moving Around Goal Status (): At least 1 percent but less than 20 percent impaired, limited or restricted    PT Discharge Summary  Anticipated Discharge Disposition: home with home health, home with assist  Reason for Discharge: Discharge from facility, Per MD order  Outcomes Achieved: Patient able to partially acheive established goals  Discharge Destination: Home with home health, Home with assist    Fidencio Alejandra PTA  4/22/2017

## 2017-04-22 NOTE — PLAN OF CARE
Problem: Patient Care Overview (Adult)  Goal: Plan of Care Review  Outcome: Ongoing (interventions implemented as appropriate)    04/22/17 0449   Coping/Psychosocial Response Interventions   Plan Of Care Reviewed With patient   Patient Care Overview   Progress no change       Goal: Adult Individualization and Mutuality  Outcome: Ongoing (interventions implemented as appropriate)  Goal: Discharge Needs Assessment  Outcome: Ongoing (interventions implemented as appropriate)    Problem: Pneumonia (Adult)  Goal: Signs and Symptoms of Listed Potential Problems Will be Absent or Manageable (Pneumonia)  Outcome: Ongoing (interventions implemented as appropriate)

## 2017-04-22 NOTE — DISCHARGE PLACEMENT REQUEST
"StarJustino wolf Cabarrus (86 y.o. Male)     Date of Birth Social Security Number Address Home Phone MRN    06/13/1930  [address]uthie GOLD   [facility] [id number] [phone] [id number]    Sikhism Marital Status          Latter-day        Admission Date Admission Type Admitting Provider Attending Provider Department, Room/Bed    4/18/17 Emergency Ovidio Diaz MD Shahidi, Ovidio Huber MD [address], 383/1    Discharge Date Discharge Disposition Discharge Destination         Home or Self Care             Attending Provider: Ovidio Diaz MD     Allergies:  No Known Allergies    Isolation:  None   Infection:  None   Code Status:  FULL    Ht:  67.01\" (170.2 cm)   Wt:  145 lb 1 oz (65.8 kg)    Admission Cmt:  None   Principal Problem:  None                Active Insurance as of 4/18/2017     Primary Coverage     Payor Plan Insurance Group Employer/Plan Group    MEDICARE MEDICARE A & B      Payor Plan Address Payor Plan Phone Number Effective From Effective To    [address] [phone] 4/1/2017     [place] [address]       Subscriber Name Subscriber Birth Date Member ID       JUSTINO PIZARRO 6/13/1930 [id number]           Secondary Coverage     Payor Plan Insurance Group Employer/Plan Group    [facility]     Payor Plan Address Payor Plan Phone Number Effective From Effective To    [address] [address]  7/1/1998     [place] [address]       Subscriber Name Subscriber Birth Date Member ID       JUSTINO PIZARRO 6/13/1930 [id number]                 Emergency Contacts      (Rel.) Home Phone Work Phone Mobile Phone    Hailey Peña (Sister) [phone] -- --            Emergency Contact Information     Name Relation Home Work Mobile    Hailey Peña Sister [phone]            Insurance Information                MEDICARE/MEDICARE A & B Phone: [phone]    Subscriber: Justino Pizarro Subscriber#: [id number]    " "Group#:  Precert#:         [facility] SUP Phone:     Subscriber: Sterling Graves Subscriber#: [id number]    Group#: [facility] Precert#:              History & Physical      LEONIE Vargas at 2017  5:53 PM     Attestation signed by Ovidio Diaz MD at 2017 11:47 PM        I personally evaluated and examined the patient in conjunction with HAN Vargas and agree with the assessment, treatment plan, and disposition of the patient as recorded.    Physical Exam:  General: AOX3                                     [facility] Admission      Date of Admission: 2017      Primary Care Physician: Augustin Jones MD      Chief Complaint: Vomiting, right flank pain    HPI: 86-year-old -American male who presented to the emergency department today after being sent from his primary care provider's office.  Primary care provider reported that the patient was having abdominal pain, constipation, nausea and vomiting and there was concern for acute abdomen.  However workup in the emergency department including CT of the abdomen and pelvis was unremarkable for any acute abdomen, however the patient was noted to have right lower lobe pneumonia.  Upon evaluation in the emergency department the patient makes no mention of abdominal pain.  He reports that he has had nausea and vomiting for the past 4 days in approximately 6-8 pound weight loss.  His grandson reports that he has been having chills and \"rattling when he breathes.\"  He is being admitted for treatment of right lower lobe pneumonia.    Concurrent Medical History: BPH, hypertension, osteoarthritis, peptic ulcer disease, vertigo, esophagitis    Past Surgical History: Bilateral leg fracture repairs, G-tube placement, right total knee, right shoulder rotator cuff repair, cataract surgery    Family History: Family history significant for type 2 diabetes in the patient's mother is  " "patient also reports having siblings with history of type 2 diabetes and cancer however the patient is unable to recall what type of cancer her sister had.    Social History: Patient denies alcohol or illicit drug use.  Patient is a former pipe smoker but has not smoked since 1991.    Allergies: No Known Allergies    Medications:    Current medications include:  Avodart 0.5 mg daily  Nexium 40 mg daily  Lozol 2.5 mg daily  Toprol-XL 50 mg daily  Potassium 20 mEq daily  Vitamin D 50,000 units once a month    Review of Systems:  Review of Systems   Constitutional: Positive for chills and fatigue. Negative for fever.   Respiratory: Positive for cough. Negative for chest tightness, shortness of breath and wheezing.         \"rattling in my chest\"   Cardiovascular: Negative for chest pain and palpitations.   Gastrointestinal: Positive for constipation, nausea and vomiting. Negative for diarrhea.   Genitourinary: Positive for flank pain.        Right flank pain   Musculoskeletal: Positive for back pain, myalgias and neck pain.   Neurological: Negative for dizziness, weakness and light-headedness.   Psychiatric/Behavioral: Negative for confusion.      Otherwise complete ROS is negative except as mentioned above.    Physical Exam:   Temp:  [98.9 °F (37.2 °C)] 98.9 °F (37.2 °C)  Heart Rate:  [71-78] 73  Resp:  [16-20] 20  BP: (110-188)/(58-89) 177/75  Physical Exam   Constitutional: He is oriented to person, place, and time. Vital signs are normal. He appears cachectic. He is cooperative.   HENT:   Head: Normocephalic.   Eyes: Conjunctivae are normal.   Neck: Neck supple.   Cardiovascular: Normal rate, regular rhythm, normal heart sounds and intact distal pulses.    Pulmonary/Chest: Effort normal. No respiratory distress. He has decreased breath sounds in the right lower field and the left lower field.   Abdominal: Soft. Bowel sounds are normal. He exhibits no distension. There is no tenderness.   Neurological: He is alert " and oriented to person, place, and time.   Skin: Skin is warm and dry.   Psychiatric: He has a normal mood and affect. His behavior is normal.   Vitals reviewed.        Results Reviewed:  I have personally reviewed current lab, radiology, and data and agree with results.  Lab Results (last 24 hours)     Procedure Component Value Units Date/Time    CBC & Differential [[id number]] Collected:  04/18/17 1353    Specimen:  Blood Updated:  04/18/17 1406    Narrative:       The following orders were created for panel order CBC & Differential.  Procedure                               Abnormality         Status                     ---------                               -----------         ------                     CBC Auto Differential[[id number]]         Abnormal            Final result                 Please view results for these tests on the individual orders.    CBC Auto Differential [[id number]]  (Abnormal) Collected:  04/18/17 1353    Specimen:  Blood Updated:  04/18/17 1406     WBC 8.87 10*3/mm3      RBC 4.19 (L) 10*6/mm3      Hemoglobin 12.4 (L) g/dL      Hematocrit 36.2 (L) %      MCV 86.4 fL      MCH 29.6 pg      MCHC 34.3 g/dL      RDW 13.7 %      RDW-SD 43.8 fl      MPV 9.5 fL      Platelets 157 10*3/mm3      Neutrophil % 70.1 %      Lymphocyte % 12.1 %      Monocyte % 17.5 (H) %      Eosinophil % 0.2 %      Basophil % 0.0 %      Immature Grans % 0.1 %      Neutrophils, Absolute 6.22 10*3/mm3      Lymphocytes, Absolute 1.07 10*3/mm3      Monocytes, Absolute 1.55 (H) 10*3/mm3      Eosinophils, Absolute 0.02 10*3/mm3      Basophils, Absolute 0.00 10*3/mm3      Immature Grans, Absolute 0.01 10*3/mm3     Comprehensive Metabolic Panel [[id number]]  (Abnormal) Collected:  04/18/17 1353    Specimen:  Blood Updated:  04/18/17 1417     Glucose 106 (H) mg/dL      BUN 28 (H) mg/dL      Creatinine 0.98 mg/dL      Sodium 136 (L) mmol/L      Potassium 4.1 mmol/L      Chloride 96 mmol/L      CO2 27.0 mmol/L      Calcium 9.2 mg/dL       Total Protein 7.5 g/dL      Albumin 3.80 g/dL      ALT (SGPT) 16 (L) U/L      AST (SGOT) 44 U/L      Alkaline Phosphatase 77 U/L      Total Bilirubin 0.9 mg/dL      eGFR  Lee Fragaer 88 mL/min/1.73      Globulin 3.7 (H) gm/dL      A/G Ratio 1.0 (L) g/dL      BUN/Creatinine Ratio 28.6 (H)     Anion Gap 13.0 mmol/L     Narrative:       The MDRD GFR formula is only valid for adults with stable renal function between ages 18 and 70.    Lipase [[id number]]  (Normal) Collected:  04/18/17 1353    Specimen:  Blood Updated:  04/18/17 1417     Lipase 42 U/L     Troponin [[id number]]  (Normal) Collected:  04/18/17 1353    Specimen:  Blood Updated:  04/18/17 1429     Troponin I <0.012 ng/mL     Cades Draw [[id number]] Collected:  04/18/17 1353    Specimen:  Blood Updated:  04/18/17 1502    Narrative:       The following orders were created for panel order Cades Draw.  Procedure                               Abnormality         Status                     ---------                               -----------         ------                     Light Blue Top[[id number]]                                    Final result               Green Top (Gel)[[id number]]                                   Final result               Lavender Top[[id number]]                                      Final result               Gold Top - SST[[id number]]                                    Final result                 Please view results for these tests on the individual orders.    Light Blue Top [[id number]] Collected:  04/18/17 1353    Specimen:  Blood Updated:  04/18/17 1502     Extra Tube hold for add-on      Auto resulted       Green Top (Gel) [[id number]] Collected:  04/18/17 1353    Specimen:  Blood Updated:  04/18/17 1502     Extra Tube Hold for add-ons.      Auto resulted.       Lavender Top [[id number]] Collected:  04/18/17 1353    Specimen:  Blood Updated:  04/18/17 1502     Extra Tube hold for add-on      Auto resulted       Gold Top - SST [[id number]]  Collected:  04/18/17 1353    Specimen:  Blood Updated:  04/18/17 1502     Extra Tube Hold for add-ons.      Auto resulted.       Urinalysis With / Culture If Indicated [[id number]]  (Abnormal) Collected:  04/18/17 1438    Specimen:  Urine from Urine, Clean Catch Updated:  04/18/17 1505     Color, UA Orange (A)     Appearance, UA Slightly Cloudy (A)     pH, UA 6.0     Specific Gravity, UA 1.020     Glucose, UA Negative     Ketones, UA Negative     Bilirubin, UA Small (1+) (A)     Blood, UA Trace (A)     Protein,  mg/dL (2+) (A)     Leuk Esterase, UA Trace (A)     Nitrite, UA Negative     Urobilinogen, UA 4.0 E.U./dL (A)    Lactic Acid, Plasma [[id number]]  (Abnormal) Collected:  04/18/17 1456    Specimen:  Blood from Arm, Left Updated:  04/18/17 1523     Lactate 2.2 (C) mmol/L     Urinalysis, Microscopic Only [[id number]]  (Abnormal) Collected:  04/18/17 1438    Specimen:  Urine from Urine, Clean Catch Updated:  04/18/17 1643     RBC, UA None Seen /HPF      WBC, UA 3-5 /HPF      Bacteria, UA Trace (A) /HPF      Squamous Epithelial Cells, UA 0-2 /HPF      Hyaline Casts, UA 0-2 /LPF      Methodology Automated Microscopy        Imaging Results (last 24 hours)     Procedure Component Value Units Date/Time    CT Abdomen Pelvis With Contrast [[id number]] Collected:  04/18/17 1640     Updated:  04/18/17 1655    Narrative:         EXAMINATION:  Computed Tomography           REGION:    Abdomen / Pelvis                     INDICATION:    Generalized abdominal pain      HISTORY:    Prostate carcinoma      LINDA. IMAGING:    none            TECHNIQUE:      - reconstructions:    axial, coronal, sagittal         - contrast:      oral:  Yes ;   intravenous:  Yes -Isovue 300,  80 mL     This exam was performed according to the departmental  dose-optimization program which includes automated exposure  control, adjustment of the mA and/or kV according to patient size  and/or use of iterative reconstruction technique.           COMMENTS:             - - - CT ABDOMEN - - -          THORAX (INFERIOR):      - LUNG BASES:  clear      - PLEURA:    no fluid or mass      - HEART:    normal size, no pericardial fluid     - MISC:      n/a          ABDOMEN:     - LIVER:    normal size / contour, no ductal dilatation , single  focal hypoattenuating lesion in the left lobe measuring 0.8 cm,  nonenhancing, presumably of a benign etiology.   - GB:      grossly negative    - CBD:      grossly negative   - SPLEEN:    normal size and contour   - PANCREAS:    normal in size, contour, no focal mass    - VISCERA:    normal caliber, no wall thickening     - MESENTERY:  no mesenteric mass   - CAVITY:    no free abdominal fluid, no free intraperitoneal  air   - BODY WALL:  wnl   - OSSEOUS:  Degenerative changes associated with the inferior  lumbar spine with moderate to high-grade acquired spinal stenosis  at the L4/5 and L3/4 levels with high-grade spinal neural  foraminal narrowing on the right at these levels.   - MISC:                                      RETROPERITONEUM:   - KIDNEYS:    normal size / contour, no collecting system  dilation; multiple nonenhancing hypoattenuating lesions, likely  cysts. No evidence of an enhancing mass.   - URETERS:    normal course, caliber   - ADRENALS:    normal size, contour   - MISC:      no sig retroperitoneal adenopathy or mass   - VASCULAR:    aorta / iliacs: wnl for age     - - - CT PELVIS - - -      - VISCERA:    normal caliber small/large bowel, no focal  thickening/mass    - MESENTERY:  no mass   - VASCULAR:    wnl for age   - CAVITY:    no free fluid / air   - BLADDER:    unremarkable   - OSSEOUS:    wnl    - MISC:   - PROSTATE:  1.3 cm bulge in the right peripheral zone with mild  enhancement, suspicious for the presence of a neoplastic process.  Suggest correlation with PSA. (Axial 78/103)     .       Impression:        CONCLUSION:  1. No evidence of an acute intra-abdominal/pelvic process.  2. Right lower lobe airspace  disease likely representing  pneumonia.  3. Multilevel degenerative changes in the lumbar spine as  described above, suggest correlation with MRI.  4. Nodule in the prostate gland, suspicious for the presence of a  neoplastic process, and correlation with PSA is recommended.  Biopsy suggested.      Electronically signed by:  GLENIS Gutierrez MD  4/18/2017 4:54  PM CDT Workstation: [employer]            Assessment/Plan:  1.  Right lower lobe pneumonia: IV antibiotics, nebulizer treatments, oxygen therapy, incentive spirometry.  #2.  Acute cystitis: Cultures pending, IV antibiotics will be given.  #3.  Mild dehydration: IV hydration  #4 hypertension: Continue patient's home medications.  #5 history of peptic ulcer disease  #6 history BPH  #7 prostate nodules noted on CT scan    Plan of care has been discussed with the patient and his grandchildren who are at bedside.  Approximately 1 hour spent on the admission process for this patient.  Further orders and the patient will depend upon the hospital course.  CODE STATUS was discussed with the patient at this time he wishes to be full CODE STATUS.    LEONIE Vargas  04/18/17  5:54 PM                   Electronically signed by Ovidio Diaz MD at 4/18/2017 11:47 PM           Physician Progress Notes (last 24 hours) (Notes from 4/21/2017  2:23 PM through 4/22/2017  2:23 PM)      Ahsan Brunner MD at 4/21/2017  2:37 PM  Version 1 of 1                [facility] Medicine Services  INPATIENT PROGRESS NOTE     LOS: 3 days   Patient Care Team:  Augustin Jones MD as PCP - General (Family Medicine)    Chief Complaint: Patient feels a lot better today.  Pain on the right side of the neck has much improved.  X-ray of the C-spine showed degenerative arthritis from C5 to C7.  He also is less deconditioned and did well with physiotherapy although it was recommended that he might need some home health assistance on discharge..        Subjective     Interval History:     Patient Complaints:     History taken from:     Review of Systems:    Review of Systems   Constitutional: Positive for fatigue. Negative for chills and fever.   Respiratory: Negative for cough, chest tightness, shortness of breath and wheezing.    Cardiovascular: Negative for chest pain, palpitations and leg swelling.   Gastrointestinal: Negative for abdominal pain, constipation, diarrhea and vomiting.   Genitourinary: Negative for dysuria, flank pain, frequency, hematuria and urgency.   Musculoskeletal: Positive for arthralgias, myalgias and neck pain. Negative for neck stiffness.   Neurological: Positive for weakness. Negative for dizziness, tremors, speech difficulty, numbness and headaches.   Psychiatric/Behavioral: Negative for agitation and confusion.         Objective     Vital Signs  Temp:  [98.2 °F (36.8 °C)-100 °F (37.8 °C)] 99.6 °F (37.6 °C)  Heart Rate:  [70-88] 74  Resp:  [18-20] 18  BP: (115-160)/(59-82) 129/82    Physical Exam:   Physical Exam   Abdominal: Soft. Bowel sounds are normal. He exhibits no distension. There is no tenderness.   Nursing note and vitals reviewed.  Cardiovascular: Regular rate and rhythm.  Lungs: Good air entry bilaterally.  Extremities: No edema.   Results Review:       Results from last 7 days  Lab Units 04/19/17  0708 04/18/17  1353   SODIUM mmol/L 138 136*   POTASSIUM mmol/L 3.9 4.1   CHLORIDE mmol/L 103 96   TOTAL CO2 mmol/L 25.0 27.0   BUN mg/dL 17 28*   CREATININE mg/dL 0.78 0.98   GLUCOSE mg/dL 113* 106*   CALCIUM mg/dL 8.8 9.2   BILIRUBIN mg/dL  --  0.9   ALK PHOS U/L  --  77   ALT (SGPT) U/L  --  16*   AST (SGOT) U/L  --  44               Results from last 7 days  Lab Units 04/19/17  0708 04/18/17  1353   WBC 10*3/mm3 6.55 8.87   HEMOGLOBIN g/dL 12.1* 12.4*   HEMATOCRIT % 35.5* 36.2*   PLATELETS 10*3/mm3 152 157       Lab Results   Component Value Date    TROPONINI <0.012 04/18/2017       CO2   Date Value Ref Range Status    04/19/2017 25.0 22.0 - 31.0 mmol/L Final              Imaging Results (last 7 days)     Procedure Component Value Units Date/Time    CT Abdomen Pelvis With Contrast [[id number]] Collected:  04/18/17 1640     Updated:  04/18/17 1655    Narrative:         EXAMINATION:  Computed Tomography           REGION:    Abdomen / Pelvis                     INDICATION:    Generalized abdominal pain      HISTORY:    Prostate carcinoma      LINDA. IMAGING:    none            TECHNIQUE:      - reconstructions:    axial, coronal, sagittal         - contrast:      oral:  Yes ;   intravenous:  Yes -Isovue 300,  80 mL     This exam was performed according to the departmental  dose-optimization program which includes automated exposure  control, adjustment of the mA and/or kV according to patient size  and/or use of iterative reconstruction technique.           COMMENTS:            - - - CT ABDOMEN - - -          THORAX (INFERIOR):      - LUNG BASES:  clear      - PLEURA:    no fluid or mass      - HEART:    normal size, no pericardial fluid     - MISC:      n/a          ABDOMEN:     - LIVER:    normal size / contour, no ductal dilatation , single  focal hypoattenuating lesion in the left lobe measuring 0.8 cm,  nonenhancing, presumably of a benign etiology.   - GB:      grossly negative    - CBD:      grossly negative   - SPLEEN:    normal size and contour   - PANCREAS:    normal in size, contour, no focal mass    - VISCERA:    normal caliber, no wall thickening     - MESENTERY:  no mesenteric mass   - CAVITY:    no free abdominal fluid, no free intraperitoneal  air   - BODY WALL:  wnl   - OSSEOUS:  Degenerative changes associated with the inferior  lumbar spine with moderate to high-grade acquired spinal stenosis  at the L4/5 and L3/4 levels with high-grade spinal neural  foraminal narrowing on the right at these levels.   - MISC:                                      RETROPERITONEUM:   - KIDNEYS:    normal size / contour, no collecting  system  dilation; multiple nonenhancing hypoattenuating lesions, likely  cysts. No evidence of an enhancing mass.   - URETERS:    normal course, caliber   - ADRENALS:    normal size, contour   - MISC:      no sig retroperitoneal adenopathy or mass   - VASCULAR:    aorta / iliacs: wnl for age     - - - CT PELVIS - - -      - VISCERA:    normal caliber small/large bowel, no focal  thickening/mass    - MESENTERY:  no mass   - VASCULAR:    wnl for age   - CAVITY:    no free fluid / air   - BLADDER:    unremarkable   - OSSEOUS:    wnl    - MISC:   - PROSTATE:  1.3 cm bulge in the right peripheral zone with mild  enhancement, suspicious for the presence of a neoplastic process.  Suggest correlation with PSA. (Axial 78/103)     .       Impression:        CONCLUSION:  1. No evidence of an acute intra-abdominal/pelvic process.  2. Right lower lobe airspace disease likely representing  pneumonia.  3. Multilevel degenerative changes in the lumbar spine as  described above, suggest correlation with MRI.  4. Nodule in the prostate gland, suspicious for the presence of a  neoplastic process, and correlation with PSA is recommended.  Biopsy suggested.      Electronically signed by:  GLENIS Gutierrez MD  4/18/2017 4:54  PM CDT Workstation: [id number]    XR Chest PA & Lateral [[id number]] Collected:  04/20/17 1134     Updated:  04/20/17 1136    Narrative:         EXAM:          Radiograph(s), Chest   VIEWS:    PA / Lat ; 2       DATE/TIME:  4/20/2017 11:34 AM CDT               INDICATION:    Pneumonia follow-up           COMPARISON:  CXR: none          FINDINGS:             - lines/tubes:    none     - cardiac:         size within normal limits         - mediastinum: contour within normal limits         - lungs:         Persistent airspace changes in the right base.  The remainder of the lungs are clear.             - pleura:         no evidence of  fluid                  - osseous:         unremarkable for age                  -  misc.:         Impression:       CONCLUSION:        1. Persistent focal airspace disease in the right base,  presumably posterior basilar segment. Continued radiographic  follow-up to clearing is recommended.                                   Electronically signed by:  GLENIS Gutierrez MD  4/20/2017 11:35  AM CDT Workstation: [email or web address]    XR Spine Cervical 2 or 3 View [[id number]] Collected:  04/20/17 1518     Updated:  04/20/17 1521    Narrative:       Patient Name:  MR. JUSTINO PIZARRO  Patient ID:  [id number]   Ordering:  FRANCISCO SAWANT  Attending:  ALLAN OWENS  Referring:  FRANCISCO SAWANT  ------------------------------------------------    Procedure: Cervical spine    Indication:  Right neck pain       Technique:  Three    views    Prior relevant exam:  None    Excessive degenerative, spondylotic changes. Exuberant anterior  osteophytes and fusing syndesmophytes at C5-C6 and C6-C7. Disc  space narrowing C6-C7. The cervical spine is otherwise  unremarkable. No evidence of any acute fracture subluxation or  bony destruction.      Impression:       CONCLUSION: Degenerative, spondylotic changes.    Electronically signed by:  Pedro Terrazas MD  4/20/2017 3:20 PM CDT  Workstation: [id number]           Blood Culture   Date Value Ref Range Status   04/18/2017 No growth at 2 days  Preliminary                    Urine Culture   Date Value Ref Range Status   04/19/2017 No growth at 24 hours  Final           Medication Review:   Current Facility-Administered Medications   Medication Dose Route Frequency Provider Last Rate Last Dose   • acetaminophen (TYLENOL) tablet 650 mg  650 mg Oral Q4H PRN Magda Chavez, APRN   650 mg at 04/21/17 1206   • cefTRIAXone (ROCEPHIN) 1 g/100 mL 0.9% NS (MBP)  1 g Intravenous Q24H Magda Chavez, APRN   1 g at 04/20/17 1638    And   • AZITHROMYCIN 500 MG/250 ML 0.9% NS IVPB (vial-mate)  500 mg Intravenous Q24H Magda Chavez, APRN   500 mg at 04/20/17 1836   • docusate  sodium (COLACE) capsule 100 mg  100 mg Oral BID Magda Chavez, APRN   100 mg at 04/21/17 0907   • dutasteride (AVODART) capsule 0.5 mg  0.5 mg Oral Daily Magda Chavez, APRN   0.5 mg at 04/21/17 0904   • indapamide (LOZOL) tablet 2.5 mg  2.5 mg Oral Daily Magda Chavez, APRN   2.5 mg at 04/21/17 0904   • ipratropium-albuterol (DUO-NEB) nebulizer solution 3 mL  3 mL Nebulization Q4H - RT Magda Chavez APRN   3 mL at 04/21/17 1112   • metoprolol succinate XL (TOPROL-XL) 24 hr tablet 50 mg  50 mg Oral Daily Magda Chavez APRN   50 mg at 04/21/17 0907   • Morphine sulfate (PF) injection 1 mg  1 mg Intravenous Q4H PRN Doug Dumont MD   1 mg at 04/20/17 1216   • ondansetron (ZOFRAN) tablet 4 mg  4 mg Oral Q6H PRN LEONIE Vargas        Or   • ondansetron ODT (ZOFRAN-ODT) disintegrating tablet 4 mg  4 mg Oral Q6H PRN LEONIE Vargas        Or   • ondansetron (ZOFRAN) injection 4 mg  4 mg Intravenous Q6H PRN Magda Chavez APRN       • pantoprazole (PROTONIX) EC tablet 40 mg  40 mg Oral Q AM Magda Chavez, APRN   40 mg at 04/21/17 0904   • polyethylene glycol (MIRALAX) powder 17 g  17 g Oral Daily Magda Chavez, APRN   17 g at 04/21/17 0904   • potassium chloride (MICRO-K) CR capsule 20 mEq  20 mEq Oral Daily Magda Chavez, APRN   20 mEq at 04/21/17 0904   • sodium chloride 0.9 % flush 1-10 mL  1-10 mL Intravenous PRN Magda Chavez APRN       • sodium chloride 0.9 % flush 10 mL  10 mL Intravenous PRN Jen Pelayo MD   10 mL at 04/18/17 1630   • sodium chloride 0.9 % infusion  75 mL/hr Intravenous Continuous Magda Chavez APRN 75 mL/hr at 04/21/17 0724 75 mL/hr at 04/21/17 0724         Assessment/Plan     1.  Pneumonia: Improving.  2.  Neck pain: Improved and due to degenerative arthritis.  3.  Disposition: Likely discharge in a..      Ahsan Brunner MD  04/21/17      EMR Dragon/Transcription disclaimer:   Much of this encounter note is an electronic transcription/translation of spoken language to  printed text. The electronic translation of spoken language may permit erroneous, or at times, nonsensical words or phrases to be inadvertently transcribed; Although I have reviewed the note for such errors, some may still exist.                 Electronically signed by Francisco Brunner MD at 2017  2:44 PM          [address] [address]  Phone: [phone]  Fax:  Date: 2017      Ambulatory Referral to Home Health     Patient: Sterling Graves MRN: [id number]   [address]  [facility] [address] : 1930  SSN:    Phone: [phone] Sex: M      INSURANCE PAYOR PLAN GROUP # SUBSCRIBER ID   Primary:  Secondary: MEDICARE  [facility] [id number]  [id number]    [facility] [id number]  [id number]      Referring Provider Information:  FRANCISCO BRUNNER Phone: [phone] Fax:       Referral Information:   # Visits: 1 Referral Type: Home Health [42]   Urgency: Routine Referral Reason: Specialty Services Required   Start Date: 2017 End Date: To be determined by Insurer   Diagnosis: Pneumonia of right lower lobe due to infectious organism (J18.9 [ICD-10-CM] 483.8 [ICD-9-CM])  Impaired mobility and activities of daily living (Z74.09 [ICD-10-CM] 799.89 [ICD-9-CM])      Refer to Dept:   Refer to Provider:   Refer to Facility:      Face to Face Visit Date: 2017  Follow-up Provider for Plan of Care? I treated the patient in an acute care facility and will not continue treatment after discharge.  Follow-up Provider: SHAHRIAR FOX [[id number]]  Reason/Clinical Findings: deconditioning  Describe mobility limitations that make leaving home difficult: deconditioning  Nursing/Therapeutic Services Requested: Skilled Nursing  Skilled nursing orders: Medication education  Frequency: 1 Week 1     This document serves as a request of services and does not constitute Insurance authorization or approval of services.  To determine eligibility, please  contact the members Insurance carrier to verify and review coverage.     If you have medical questions regarding this request for services. Please contact [address] at [phone] between the hours of 8:00am - 5:00pm (Mon-Fri).             Authorizing Provider:Ahsan Brunner MD  Order Entered By: Ahsan Brunner MD 4/22/2017 2:00 PM     Electronically signed by: Ahsan Brunner MD (NPI: [id number]) 4/22/2017 2:00 PM            Consult Notes (all)     No notes of this type exist for this encounter.

## 2017-04-23 LAB
BACTERIA SPEC AEROBE CULT: NORMAL
BACTERIA SPEC AEROBE CULT: NORMAL

## 2017-05-02 ENCOUNTER — OFFICE VISIT (OUTPATIENT)
Dept: FAMILY MEDICINE CLINIC | Facility: CLINIC | Age: 82
End: 2017-05-02

## 2017-05-02 VITALS
BODY MASS INDEX: 21.88 KG/M2 | DIASTOLIC BLOOD PRESSURE: 78 MMHG | HEIGHT: 67 IN | SYSTOLIC BLOOD PRESSURE: 118 MMHG | WEIGHT: 139.4 LBS

## 2017-05-02 DIAGNOSIS — J18.9 PNEUMONIA OF RIGHT LUNG DUE TO INFECTIOUS ORGANISM, UNSPECIFIED PART OF LUNG: Primary | ICD-10-CM

## 2017-05-02 PROCEDURE — 99213 OFFICE O/P EST LOW 20 MIN: CPT | Performed by: FAMILY MEDICINE

## 2017-05-17 ENCOUNTER — OFFICE VISIT (OUTPATIENT)
Dept: FAMILY MEDICINE CLINIC | Facility: CLINIC | Age: 82
End: 2017-05-17

## 2017-05-17 VITALS
SYSTOLIC BLOOD PRESSURE: 130 MMHG | HEIGHT: 67 IN | BODY MASS INDEX: 22.11 KG/M2 | DIASTOLIC BLOOD PRESSURE: 78 MMHG | WEIGHT: 140.9 LBS

## 2017-05-17 DIAGNOSIS — J18.9 PNEUMONIA OF RIGHT LOWER LOBE DUE TO INFECTIOUS ORGANISM: Primary | ICD-10-CM

## 2017-05-17 PROCEDURE — 99213 OFFICE O/P EST LOW 20 MIN: CPT | Performed by: FAMILY MEDICINE

## 2017-07-24 DIAGNOSIS — E55.9 VITAMIN D DEFICIENCY: ICD-10-CM

## 2017-07-25 DIAGNOSIS — K21.9 GASTROESOPHAGEAL REFLUX DISEASE WITHOUT ESOPHAGITIS: ICD-10-CM

## 2017-07-25 RX ORDER — ERGOCALCIFEROL 1.25 MG/1
CAPSULE ORAL
Qty: 3 CAPSULE | Refills: 3 | Status: SHIPPED | OUTPATIENT
Start: 2017-07-25 | End: 2018-04-18 | Stop reason: SDUPTHER

## 2017-10-09 ENCOUNTER — OFFICE VISIT (OUTPATIENT)
Dept: FAMILY MEDICINE CLINIC | Facility: CLINIC | Age: 82
End: 2017-10-09

## 2017-10-09 VITALS
BODY MASS INDEX: 21.91 KG/M2 | DIASTOLIC BLOOD PRESSURE: 72 MMHG | WEIGHT: 139.6 LBS | HEIGHT: 67 IN | SYSTOLIC BLOOD PRESSURE: 128 MMHG

## 2017-10-09 DIAGNOSIS — M17.32 POST-TRAUMATIC OSTEOARTHRITIS OF LEFT KNEE: Chronic | ICD-10-CM

## 2017-10-09 DIAGNOSIS — I10 ESSENTIAL HYPERTENSION: Primary | Chronic | ICD-10-CM

## 2017-10-09 DIAGNOSIS — M16.52 POST-TRAUMATIC OSTEOARTHRITIS OF LEFT HIP: Chronic | ICD-10-CM

## 2017-10-09 PROBLEM — K21.9 GASTROESOPHAGEAL REFLUX DISEASE WITHOUT ESOPHAGITIS: Chronic | Status: ACTIVE | Noted: 2017-04-05

## 2017-10-09 PROBLEM — E55.9 VITAMIN D DEFICIENCY: Status: RESOLVED | Noted: 2017-04-05 | Resolved: 2017-10-09

## 2017-10-09 PROBLEM — E86.0 DEHYDRATION: Status: RESOLVED | Noted: 2017-04-18 | Resolved: 2017-10-09

## 2017-10-09 PROBLEM — J18.9 PNEUMONIA: Status: RESOLVED | Noted: 2017-04-18 | Resolved: 2017-10-09

## 2017-10-09 PROCEDURE — 99214 OFFICE O/P EST MOD 30 MIN: CPT | Performed by: FAMILY MEDICINE

## 2017-10-09 NOTE — PROGRESS NOTES
Subjective   Sterling Graves is a 87 y.o. male.     History of Present Illness   reevaluation osteoarthritis hypertension history of reflux or history of pneumonia.  In the interim continues to maintain weight and diet continues to volunteer at the hospital.  Medicines are up-to-date.  Last lab work acceptable.  Gets flu vaccine when he monitors hospital.  Overall stable    The following portions of the patient's history were reviewed and updated as appropriate: allergies, current medications, past family history, past medical history, past social history, past surgical history and problem list.    Review of Systems   Constitutional: Negative for activity change, appetite change, fatigue and unexpected weight change.   HENT: Negative for trouble swallowing and voice change.    Eyes: Negative for redness and visual disturbance.   Respiratory: Negative for cough and wheezing.    Cardiovascular: Negative for chest pain and palpitations.   Gastrointestinal: Negative for abdominal pain, constipation, diarrhea, nausea and vomiting.   Genitourinary: Negative for urgency.   Musculoskeletal: Positive for arthralgias. Negative for joint swelling.   Neurological: Negative for syncope and headaches.   Hematological: Negative for adenopathy.   Psychiatric/Behavioral: Negative for sleep disturbance.       Objective   Physical Exam   Constitutional: He is oriented to person, place, and time. He appears well-developed.   HENT:   Head: Normocephalic.   Nose: Nose normal.   Eyes: Pupils are equal, round, and reactive to light.   Neck: Normal range of motion. No thyromegaly present.   Cardiovascular: Normal rate, regular rhythm, normal heart sounds and intact distal pulses.  Exam reveals no gallop and no friction rub.    No murmur heard.  Pulmonary/Chest: Breath sounds normal.   Abdominal: Soft. He exhibits no distension and no mass. There is no tenderness.   Musculoskeletal: Normal range of motion. He exhibits tenderness  (Decreased range of motion of the hips.  The cane but is stable I get up and goes approximately 8 seconds without pain).   Neurological: He is alert and oriented to person, place, and time. He has normal reflexes.   Skin: Skin is warm and dry.   Psychiatric: He has a normal mood and affect.       Assessment/Plan   Problems Addressed this Visit        Cardiovascular and Mediastinum    Essential hypertension - Primary (Chronic)       Musculoskeletal and Integument    Post-traumatic osteoarthritis of left knee (Chronic)    Post-traumatic osteoarthritis of left hip (Chronic)         on fall prevention.   on hydration lifestyle measures etc.  Recheck again in 6 months

## 2018-04-18 ENCOUNTER — OFFICE VISIT (OUTPATIENT)
Dept: FAMILY MEDICINE CLINIC | Facility: CLINIC | Age: 83
End: 2018-04-18

## 2018-04-18 ENCOUNTER — APPOINTMENT (OUTPATIENT)
Dept: LAB | Facility: HOSPITAL | Age: 83
End: 2018-04-18

## 2018-04-18 VITALS
WEIGHT: 143.8 LBS | SYSTOLIC BLOOD PRESSURE: 148 MMHG | BODY MASS INDEX: 22.57 KG/M2 | HEIGHT: 67 IN | DIASTOLIC BLOOD PRESSURE: 80 MMHG

## 2018-04-18 DIAGNOSIS — I10 ESSENTIAL HYPERTENSION: Primary | Chronic | ICD-10-CM

## 2018-04-18 DIAGNOSIS — N40.0 BENIGN PROSTATIC HYPERPLASIA WITHOUT LOWER URINARY TRACT SYMPTOMS: Chronic | ICD-10-CM

## 2018-04-18 DIAGNOSIS — E55.9 VITAMIN D DEFICIENCY: ICD-10-CM

## 2018-04-18 DIAGNOSIS — K21.9 GASTROESOPHAGEAL REFLUX DISEASE WITHOUT ESOPHAGITIS: ICD-10-CM

## 2018-04-18 PROBLEM — H90.8 MIXED CONDUCTIVE AND SENSORINEURAL HEARING LOSS: Chronic | Status: ACTIVE | Noted: 2018-04-18

## 2018-04-18 PROBLEM — H90.8 MIXED CONDUCTIVE AND SENSORINEURAL HEARING LOSS: Status: ACTIVE | Noted: 2018-04-18

## 2018-04-18 LAB
ALBUMIN SERPL-MCNC: 4.3 G/DL (ref 3.4–4.8)
ALBUMIN/GLOB SERPL: 1.1 G/DL (ref 1.1–1.8)
ALP SERPL-CCNC: 67 U/L (ref 38–126)
ALT SERPL W P-5'-P-CCNC: 9 U/L (ref 21–72)
ANION GAP SERPL CALCULATED.3IONS-SCNC: 13 MMOL/L (ref 5–15)
AST SERPL-CCNC: 40 U/L (ref 17–59)
BILIRUB SERPL-MCNC: 0.5 MG/DL (ref 0.2–1.3)
BUN BLD-MCNC: 16 MG/DL (ref 7–21)
BUN/CREAT SERPL: 17.6 (ref 7–25)
CALCIUM SPEC-SCNC: 10.3 MG/DL (ref 8.4–10.2)
CHLORIDE SERPL-SCNC: 99 MMOL/L (ref 95–110)
CO2 SERPL-SCNC: 27 MMOL/L (ref 22–31)
CREAT BLD-MCNC: 0.91 MG/DL (ref 0.7–1.3)
GFR SERPL CREATININE-BSD FRML MDRD: 96 ML/MIN/1.73 (ref 42–98)
GLOBULIN UR ELPH-MCNC: 3.8 GM/DL (ref 2.3–3.5)
GLUCOSE BLD-MCNC: 107 MG/DL (ref 60–100)
MAGNESIUM SERPL-MCNC: 2.2 MG/DL (ref 1.6–2.3)
POTASSIUM BLD-SCNC: 4.2 MMOL/L (ref 3.5–5.1)
PROT SERPL-MCNC: 8.1 G/DL (ref 6.3–8.6)
SODIUM BLD-SCNC: 139 MMOL/L (ref 137–145)

## 2018-04-18 PROCEDURE — 83735 ASSAY OF MAGNESIUM: CPT | Performed by: FAMILY MEDICINE

## 2018-04-18 PROCEDURE — 80053 COMPREHEN METABOLIC PANEL: CPT | Performed by: FAMILY MEDICINE

## 2018-04-18 PROCEDURE — 36415 COLL VENOUS BLD VENIPUNCTURE: CPT | Performed by: FAMILY MEDICINE

## 2018-04-18 PROCEDURE — 99214 OFFICE O/P EST MOD 30 MIN: CPT | Performed by: FAMILY MEDICINE

## 2018-04-18 RX ORDER — ERGOCALCIFEROL 1.25 MG/1
CAPSULE ORAL
Qty: 3 CAPSULE | Refills: 3 | Status: SHIPPED | OUTPATIENT
Start: 2018-04-18 | End: 2019-04-19 | Stop reason: SDUPTHER

## 2018-04-18 RX ORDER — INDAPAMIDE 2.5 MG/1
2.5 TABLET, FILM COATED ORAL DAILY
Qty: 90 TABLET | Refills: 3 | Status: SHIPPED | OUTPATIENT
Start: 2018-04-18 | End: 2019-04-19 | Stop reason: SDUPTHER

## 2018-04-18 RX ORDER — METOPROLOL SUCCINATE 50 MG/1
50 TABLET, EXTENDED RELEASE ORAL DAILY
Qty: 90 TABLET | Refills: 3 | Status: SHIPPED | OUTPATIENT
Start: 2018-04-18 | End: 2019-04-19 | Stop reason: SDUPTHER

## 2018-04-18 RX ORDER — ESOMEPRAZOLE MAGNESIUM 40 MG/1
40 CAPSULE, DELAYED RELEASE ORAL DAILY
Qty: 90 CAPSULE | Refills: 3 | Status: SHIPPED | OUTPATIENT
Start: 2018-04-18 | End: 2019-04-19 | Stop reason: SDUPTHER

## 2018-04-18 RX ORDER — DUTASTERIDE 0.5 MG/1
0.5 CAPSULE, LIQUID FILLED ORAL DAILY
Qty: 90 CAPSULE | Refills: 3 | Status: SHIPPED | OUTPATIENT
Start: 2018-04-18 | End: 2019-04-19 | Stop reason: SDUPTHER

## 2018-04-18 RX ORDER — POTASSIUM CHLORIDE 20 MEQ/1
20 TABLET, EXTENDED RELEASE ORAL DAILY
Qty: 90 TABLET | Refills: 3 | Status: SHIPPED | OUTPATIENT
Start: 2018-04-18 | End: 2019-04-19 | Stop reason: SDUPTHER

## 2018-10-19 ENCOUNTER — OFFICE VISIT (OUTPATIENT)
Dept: FAMILY MEDICINE CLINIC | Facility: CLINIC | Age: 83
End: 2018-10-19

## 2018-10-19 VITALS
DIASTOLIC BLOOD PRESSURE: 78 MMHG | BODY MASS INDEX: 21.67 KG/M2 | SYSTOLIC BLOOD PRESSURE: 128 MMHG | WEIGHT: 138.1 LBS | HEIGHT: 67 IN

## 2018-10-19 DIAGNOSIS — H90.6 MIXED CONDUCTIVE AND SENSORINEURAL HEARING LOSS OF BOTH EARS: Chronic | ICD-10-CM

## 2018-10-19 DIAGNOSIS — M17.32 POST-TRAUMATIC OSTEOARTHRITIS OF LEFT KNEE: Chronic | ICD-10-CM

## 2018-10-19 DIAGNOSIS — I10 ESSENTIAL HYPERTENSION: Primary | Chronic | ICD-10-CM

## 2018-10-19 PROCEDURE — 99213 OFFICE O/P EST LOW 20 MIN: CPT | Performed by: FAMILY MEDICINE

## 2018-10-19 RX ORDER — BICALUTAMIDE 50 MG/1
50 TABLET, FILM COATED ORAL DAILY
COMMUNITY
Start: 2018-10-17 | End: 2021-01-20

## 2018-10-19 NOTE — PROGRESS NOTES
Subjective   Sterling Graves is a 88 y.o. male.     History of Present Illness  valuation hypertension mild BPH osteoarthritis generalized senescence hearing loss.  Hearing continues to be bad.  Son is with him today however.  Left arthritis does seem to be slowing him down some get up and go now is 5 or 6 seconds with cane.  Wishes nothing done.  Has not fallen.  Medicines are up-to-date.  Last lab work except.  Is give flu vaccine in the community since were out.    The following portions of the patient's history were reviewed and updated as appropriate: allergies, current medications, past family history, past medical history, past social history, past surgical history and problem list.    Review of Systems   Constitutional: Negative for activity change, appetite change, fatigue and unexpected weight change.   HENT: Positive for hearing loss. Negative for trouble swallowing and voice change.    Eyes: Negative for redness and visual disturbance.   Respiratory: Negative for cough and wheezing.    Cardiovascular: Negative for chest pain and palpitations.   Gastrointestinal: Negative for abdominal pain, constipation, diarrhea, nausea and vomiting.   Genitourinary: Negative for urgency.   Musculoskeletal: Positive for arthralgias and gait problem. Negative for joint swelling.   Neurological: Negative for syncope and headaches.   Hematological: Negative for adenopathy.   Psychiatric/Behavioral: Negative for sleep disturbance.       Objective   Physical Exam   Constitutional: He is oriented to person, place, and time. He appears well-developed.   HENT:   Head: Normocephalic.   Nose: Nose normal.   Mouth/Throat: Oropharynx is clear and moist.   Eyes: Pupils are equal, round, and reactive to light.   Neck: Normal range of motion. No thyromegaly present.   Cardiovascular: Normal rate, regular rhythm, normal heart sounds and intact distal pulses.  Exam reveals no gallop and no friction rub.    No murmur  heard.  Pulmonary/Chest: Breath sounds normal.   Abdominal: Soft. He exhibits no distension and no mass. There is no tenderness.   Musculoskeletal: Normal range of motion.    up and goes above   Neurological: He is alert and oriented to person, place, and time. He has normal reflexes.   Hard of hearing   Skin: Skin is warm and dry.   Psychiatric: He has a normal mood and affect. His speech is normal. He is slowed. He exhibits abnormal recent memory.       Assessment/Plan   Sterling was seen today for hypertension.    Diagnoses and all orders for this visit:    Essential hypertension    Mixed conductive and sensorineural hearing loss of both ears    Post-traumatic osteoarthritis of left knee        fall prevention wintertime hydration recheck 6 months betime for lab

## 2019-04-19 ENCOUNTER — APPOINTMENT (OUTPATIENT)
Dept: LAB | Facility: HOSPITAL | Age: 84
End: 2019-04-19

## 2019-04-19 ENCOUNTER — OFFICE VISIT (OUTPATIENT)
Dept: FAMILY MEDICINE CLINIC | Facility: CLINIC | Age: 84
End: 2019-04-19

## 2019-04-19 VITALS
WEIGHT: 140.7 LBS | BODY MASS INDEX: 22.08 KG/M2 | SYSTOLIC BLOOD PRESSURE: 124 MMHG | DIASTOLIC BLOOD PRESSURE: 74 MMHG | HEIGHT: 67 IN

## 2019-04-19 DIAGNOSIS — I10 ESSENTIAL HYPERTENSION: Primary | Chronic | ICD-10-CM

## 2019-04-19 DIAGNOSIS — L30.8 OTHER ECZEMA: Chronic | ICD-10-CM

## 2019-04-19 DIAGNOSIS — K21.9 GASTROESOPHAGEAL REFLUX DISEASE WITHOUT ESOPHAGITIS: ICD-10-CM

## 2019-04-19 DIAGNOSIS — N40.0 BENIGN PROSTATIC HYPERPLASIA WITHOUT LOWER URINARY TRACT SYMPTOMS: Chronic | ICD-10-CM

## 2019-04-19 DIAGNOSIS — H90.6 MIXED CONDUCTIVE AND SENSORINEURAL HEARING LOSS OF BOTH EARS: Chronic | ICD-10-CM

## 2019-04-19 DIAGNOSIS — E55.9 VITAMIN D DEFICIENCY: ICD-10-CM

## 2019-04-19 DIAGNOSIS — C61 PROSTATE CANCER (HCC): ICD-10-CM

## 2019-04-19 LAB
ALBUMIN SERPL-MCNC: 4.3 G/DL (ref 3.5–5.2)
ALBUMIN/GLOB SERPL: 1.2 G/DL
ALP SERPL-CCNC: 65 U/L (ref 39–117)
ALT SERPL W P-5'-P-CCNC: 6 U/L (ref 1–41)
ANION GAP SERPL CALCULATED.3IONS-SCNC: 15.2 MMOL/L
AST SERPL-CCNC: 19 U/L (ref 1–40)
BILIRUB SERPL-MCNC: 0.4 MG/DL (ref 0.2–1.2)
BUN BLD-MCNC: 18 MG/DL (ref 8–23)
BUN/CREAT SERPL: 14.1 (ref 7–25)
CALCIUM SPEC-SCNC: 9.9 MG/DL (ref 8.6–10.5)
CHLORIDE SERPL-SCNC: 101 MMOL/L (ref 98–107)
CO2 SERPL-SCNC: 24.8 MMOL/L (ref 22–29)
CREAT BLD-MCNC: 1.28 MG/DL (ref 0.76–1.27)
GFR SERPL CREATININE-BSD FRML MDRD: 64 ML/MIN/1.73
GLOBULIN UR ELPH-MCNC: 3.7 GM/DL
GLUCOSE BLD-MCNC: 106 MG/DL (ref 65–99)
MAGNESIUM SERPL-MCNC: 2.2 MG/DL (ref 1.6–2.4)
POTASSIUM BLD-SCNC: 4.7 MMOL/L (ref 3.5–5.2)
PROT SERPL-MCNC: 8 G/DL (ref 6–8.5)
SODIUM BLD-SCNC: 141 MMOL/L (ref 136–145)

## 2019-04-19 PROCEDURE — 36415 COLL VENOUS BLD VENIPUNCTURE: CPT | Performed by: FAMILY MEDICINE

## 2019-04-19 PROCEDURE — 80053 COMPREHEN METABOLIC PANEL: CPT | Performed by: FAMILY MEDICINE

## 2019-04-19 PROCEDURE — 83735 ASSAY OF MAGNESIUM: CPT | Performed by: FAMILY MEDICINE

## 2019-04-19 PROCEDURE — 99214 OFFICE O/P EST MOD 30 MIN: CPT | Performed by: FAMILY MEDICINE

## 2019-04-19 RX ORDER — ERGOCALCIFEROL 1.25 MG/1
CAPSULE ORAL
Qty: 3 CAPSULE | Refills: 3 | Status: SHIPPED | OUTPATIENT
Start: 2019-04-19 | End: 2020-06-08 | Stop reason: SDUPTHER

## 2019-04-19 RX ORDER — METOPROLOL SUCCINATE 50 MG/1
50 TABLET, EXTENDED RELEASE ORAL DAILY
Qty: 90 TABLET | Refills: 3 | Status: SHIPPED | OUTPATIENT
Start: 2019-04-19 | End: 2020-06-08 | Stop reason: SDUPTHER

## 2019-04-19 RX ORDER — POTASSIUM CHLORIDE 20 MEQ/1
20 TABLET, EXTENDED RELEASE ORAL DAILY
Qty: 90 TABLET | Refills: 3 | Status: SHIPPED | OUTPATIENT
Start: 2019-04-19 | End: 2020-06-08 | Stop reason: SDUPTHER

## 2019-04-19 RX ORDER — DUTASTERIDE 0.5 MG/1
0.5 CAPSULE, LIQUID FILLED ORAL DAILY
Qty: 90 CAPSULE | Refills: 3 | Status: SHIPPED | OUTPATIENT
Start: 2019-04-19 | End: 2020-06-08 | Stop reason: SDUPTHER

## 2019-04-19 RX ORDER — ESOMEPRAZOLE MAGNESIUM 40 MG/1
40 CAPSULE, DELAYED RELEASE ORAL DAILY
Qty: 90 CAPSULE | Refills: 3 | Status: SHIPPED | OUTPATIENT
Start: 2019-04-19 | End: 2020-06-08 | Stop reason: SDUPTHER

## 2019-04-19 RX ORDER — INDAPAMIDE 2.5 MG/1
2.5 TABLET, FILM COATED ORAL DAILY
Qty: 90 TABLET | Refills: 3 | Status: SHIPPED | OUTPATIENT
Start: 2019-04-19 | End: 2020-06-08 | Stop reason: SDUPTHER

## 2019-04-19 NOTE — PROGRESS NOTES
Subjective   Sterling Graves is a 88 y.o. male.     History of Present Illness   valuation hypertension generalized senescence BPH.  Patient apparently most recently been diagnosed with prostate cancer the PSA of 11 currently is on just oral therapy no symptoms.  Family states short-term memory getting worse hearing getting worse but overall stable no falling    The following portions of the patient's history were reviewed and updated as appropriate: allergies, current medications, past family history, past medical history, past social history, past surgical history and problem list.    Review of Systems   Constitutional: Negative for activity change, appetite change, fatigue and unexpected weight change.   HENT: Negative for trouble swallowing and voice change.    Eyes: Negative for redness and visual disturbance.   Respiratory: Negative for cough and wheezing.    Cardiovascular: Negative for chest pain and palpitations.   Gastrointestinal: Negative for abdominal pain, constipation, diarrhea, nausea and vomiting.   Genitourinary: Negative for urgency.   Musculoskeletal: Negative for joint swelling.   Neurological: Negative for syncope and headaches.   Hematological: Negative for adenopathy.   Psychiatric/Behavioral: Positive for decreased concentration. Negative for sleep disturbance.       Objective   Physical Exam   Constitutional: He is oriented to person, place, and time. He appears well-developed.   HENT:   Head: Normocephalic.   Right Ear: External ear normal.   Nose: Nose normal.   Eyes: Pupils are equal, round, and reactive to light.   Neck: Normal range of motion. No thyromegaly present.   Cardiovascular: Normal rate, regular rhythm, normal heart sounds and intact distal pulses. Exam reveals no gallop and no friction rub.   No murmur heard.  Pulmonary/Chest: Breath sounds normal.   Abdominal: Soft. He exhibits no distension and no mass. There is no tenderness.   Musculoskeletal: Normal range of  motion.   Neurological: He is alert and oriented to person, place, and time. He has normal reflexes.   Skin: Skin is warm and dry.   Psychiatric: He has a normal mood and affect. His speech is delayed. He is slowed.   Extreme hard of hearing       Assessment/Plan   Sterling was seen today for hypertension.    Diagnoses and all orders for this visit:    Essential hypertension  -     Comprehensive Metabolic Panel  -     Magnesium  -     potassium chloride (K-DUR,KLOR-CON) 20 MEQ CR tablet; Take 1 tablet by mouth Daily.  -     metoprolol succinate XL (TOPROL-XL) 50 MG 24 hr tablet; Take 1 tablet by mouth Daily.  -     indapamide (LOZOL) 2.5 MG tablet; Take 1 tablet by mouth Daily.    Mixed conductive and sensorineural hearing loss of both ears    Other eczema    Benign prostatic hyperplasia without lower urinary tract symptoms    Gastroesophageal reflux disease without esophagitis  -     esomeprazole (NEXIUM) 40 MG capsule; Take 1 capsule by mouth Daily.    Vitamin D deficiency  -     vitamin D (ERGOCALCIFEROL) 43603 units capsule capsule; 1 month    Prostate cancer (CMS/HCC)    Other orders  -     dutasteride (AVODART) 0.5 MG capsule; Take 1 capsule by mouth Daily.      Counseled on fall prevention counseled on hydration lifestyle measures safety measures recheck 6 months

## 2019-10-21 ENCOUNTER — OFFICE VISIT (OUTPATIENT)
Dept: FAMILY MEDICINE CLINIC | Facility: CLINIC | Age: 84
End: 2019-10-21

## 2019-10-21 VITALS
SYSTOLIC BLOOD PRESSURE: 128 MMHG | WEIGHT: 133.1 LBS | DIASTOLIC BLOOD PRESSURE: 76 MMHG | BODY MASS INDEX: 20.89 KG/M2 | HEIGHT: 67 IN

## 2019-10-21 DIAGNOSIS — L30.8 OTHER ECZEMA: Chronic | ICD-10-CM

## 2019-10-21 DIAGNOSIS — C61 PROSTATE CANCER (HCC): Chronic | ICD-10-CM

## 2019-10-21 DIAGNOSIS — I10 ESSENTIAL HYPERTENSION: Primary | Chronic | ICD-10-CM

## 2019-10-21 DIAGNOSIS — H90.6 MIXED CONDUCTIVE AND SENSORINEURAL HEARING LOSS OF BOTH EARS: Chronic | ICD-10-CM

## 2019-10-21 PROCEDURE — 99213 OFFICE O/P EST LOW 20 MIN: CPT | Performed by: FAMILY MEDICINE

## 2019-10-21 RX ORDER — TRIAMCINOLONE ACETONIDE 5 MG/G
CREAM TOPICAL 3 TIMES DAILY
Qty: 60 G | Refills: 5 | Status: SHIPPED | OUTPATIENT
Start: 2019-10-21 | End: 2020-06-08 | Stop reason: SDUPTHER

## 2019-10-21 NOTE — PROGRESS NOTES
Subjective   Sterling Graves is a 89 y.o. male.     History of Present Illness   follow-up generalized senescence hypertension prostate carcinoma severe hard of hearing short-term memory loss.  Interim son states may benefit flu vaccine.  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  Continues on medicines.  Needs refill on eczema cream for the wintertime.  Overall stable's continued mild generalized decline.  HPI    The following portions of the patient's history were reviewed and updated as appropriate: allergies, current medications, past family history, past medical history, past social history, past surgical history and problem list.    Review of Systems  Review of Systems   Constitutional: Negative for activity change, appetite change, fatigue and unexpected weight change.   HENT: Positive for hearing loss. Negative for trouble swallowing and voice change.    Eyes: Negative for redness and visual disturbance.   Respiratory: Negative for cough and wheezing.    Cardiovascular: Negative for chest pain and palpitations.   Gastrointestinal: Negative for abdominal pain, constipation, diarrhea, nausea and vomiting.   Genitourinary: Negative for urgency.   Musculoskeletal: Negative for joint swelling.   Skin: Positive for rash.   Neurological: Negative for syncope and headaches.   Hematological: Negative for adenopathy.   Psychiatric/Behavioral: Negative for sleep disturbance.       Objective   Physical Exam  Physical Exam   Constitutional: He is oriented to person, place, and time. He appears well-developed.   HENT:   Head: Normocephalic.   Right Ear: External ear normal.   Nose: Nose normal.   Eyes: Pupils are equal, round, and reactive to light.   Neck: Normal range of motion. No thyromegaly present.   Cardiovascular: Normal rate, regular rhythm, normal heart sounds and intact distal pulses. Exam reveals no gallop and no friction rub.   No murmur heard.  Pulmonary/Chest: Breath sounds normal.   Abdominal: Soft. He  "exhibits no distension and no mass. There is no tenderness.   Musculoskeletal: Normal range of motion.   Get up and go 4 to 5 seconds with cane.  Walks slightly flexed   Neurological: He is alert and oriented to person, place, and time. He has normal reflexes.   Skin: Skin is warm and dry.   Minimal dryness no real rash   Psychiatric: He has a normal mood and affect. His speech is delayed. He is slowed. He exhibits abnormal recent memory.   Extreme hard of hearing         Visit Vitals  /76   Ht 170.2 cm (67\")   Wt 60.4 kg (133 lb 1.6 oz)   BMI 20.85 kg/m²     Body mass index is 20.85 kg/m².      Assessment/Plan   Sterling was seen today for hypertension.    Diagnoses and all orders for this visit:    Essential hypertension    Other eczema  -     triamcinolone (KENALOG) 0.5 % cream; Apply  topically to the appropriate area as directed 3 (Three) Times a Day. For eczema x 2 wks then prn    Prostate cancer (CMS/HCC)    Mixed conductive and sensorineural hearing loss of both ears    Counseled mainly on fall prevention lifestyle measures were discussed peck of  etc. with son.  Orders as above recheck 6 months  "

## 2019-11-08 ENCOUNTER — CLINICAL SUPPORT (OUTPATIENT)
Dept: FAMILY MEDICINE CLINIC | Facility: CLINIC | Age: 84
End: 2019-11-08

## 2019-11-08 DIAGNOSIS — Z23 NEED FOR INFLUENZA VACCINATION: ICD-10-CM

## 2019-11-08 PROCEDURE — G0008 ADMIN INFLUENZA VIRUS VAC: HCPCS | Performed by: FAMILY MEDICINE

## 2019-11-08 PROCEDURE — 90653 IIV ADJUVANT VACCINE IM: CPT | Performed by: FAMILY MEDICINE

## 2020-06-08 DIAGNOSIS — E55.9 VITAMIN D DEFICIENCY: ICD-10-CM

## 2020-06-08 DIAGNOSIS — K21.9 GASTROESOPHAGEAL REFLUX DISEASE WITHOUT ESOPHAGITIS: ICD-10-CM

## 2020-06-08 DIAGNOSIS — I10 ESSENTIAL HYPERTENSION: Chronic | ICD-10-CM

## 2020-06-08 DIAGNOSIS — L30.8 OTHER ECZEMA: Chronic | ICD-10-CM

## 2020-06-08 RX ORDER — ESOMEPRAZOLE MAGNESIUM 40 MG/1
40 CAPSULE, DELAYED RELEASE ORAL DAILY
Qty: 90 CAPSULE | Refills: 3 | Status: SHIPPED | OUTPATIENT
Start: 2020-06-08 | End: 2021-01-01 | Stop reason: SDUPTHER

## 2020-06-08 RX ORDER — ERGOCALCIFEROL 1.25 MG/1
CAPSULE ORAL
Qty: 3 CAPSULE | Refills: 3 | Status: SHIPPED | OUTPATIENT
Start: 2020-06-08 | End: 2021-01-01 | Stop reason: SDUPTHER

## 2020-06-08 RX ORDER — METOPROLOL SUCCINATE 50 MG/1
50 TABLET, EXTENDED RELEASE ORAL DAILY
Qty: 90 TABLET | Refills: 3 | Status: SHIPPED | OUTPATIENT
Start: 2020-06-08 | End: 2021-01-01 | Stop reason: SDUPTHER

## 2020-06-08 RX ORDER — POTASSIUM CHLORIDE 20 MEQ/1
20 TABLET, EXTENDED RELEASE ORAL DAILY
Qty: 90 TABLET | Refills: 3 | Status: SHIPPED | OUTPATIENT
Start: 2020-06-08 | End: 2021-01-20

## 2020-06-08 RX ORDER — INDAPAMIDE 2.5 MG/1
2.5 TABLET, FILM COATED ORAL DAILY
Qty: 90 TABLET | Refills: 3 | Status: SHIPPED | OUTPATIENT
Start: 2020-06-08 | End: 2021-01-20

## 2020-06-08 RX ORDER — TRIAMCINOLONE ACETONIDE 5 MG/G
CREAM TOPICAL 3 TIMES DAILY
Qty: 60 G | Refills: 5 | Status: SHIPPED | OUTPATIENT
Start: 2020-06-08

## 2020-06-08 RX ORDER — DUTASTERIDE 0.5 MG/1
0.5 CAPSULE, LIQUID FILLED ORAL DAILY
Qty: 90 CAPSULE | Refills: 3 | Status: SHIPPED | OUTPATIENT
Start: 2020-06-08 | End: 2021-01-01 | Stop reason: SDUPTHER

## 2020-06-08 NOTE — TELEPHONE ENCOUNTER
Patients grandson called in requesting new prescriptions for the patients vitamin D (ERGOCALCIFEROL) 88756 units capsule, triamcinolone (KENALOG) 0.5 % cream, potassium chloride (K-DUR,KLOR-CON) 20 MEQ CR tablet, metoprolol succinate XL (TOPROL-XL) 50 MG 24 hr tablet, indapamide (LOZOL) 2.5 MG tablet, esomeprazole (NEXIUM) 40 MG capsule, and dutasteride (AVODART) 0.5 MG capsule medications. He requests these medications [facility] [place] [place] - [address] AT [facility] - [phone] [facility] [phone] FX; he states due to COVID-19, he has not felt comfortable bringing the patient in office.    For any questions, issues, or if a visit is necessary, please call behzad mondragon back at   [phone] (Sterling Graves Jr.)

## 2020-07-20 ENCOUNTER — LAB (OUTPATIENT)
Dept: LAB | Facility: HOSPITAL | Age: 85
End: 2020-07-20

## 2020-07-20 ENCOUNTER — OFFICE VISIT (OUTPATIENT)
Dept: FAMILY MEDICINE CLINIC | Facility: CLINIC | Age: 85
End: 2020-07-20

## 2020-07-20 VITALS
BODY MASS INDEX: 19.74 KG/M2 | WEIGHT: 126.06 LBS | DIASTOLIC BLOOD PRESSURE: 66 MMHG | OXYGEN SATURATION: 99 % | HEART RATE: 99 BPM | SYSTOLIC BLOOD PRESSURE: 128 MMHG

## 2020-07-20 DIAGNOSIS — H90.0 CONDUCTIVE HEARING LOSS, BILATERAL: ICD-10-CM

## 2020-07-20 DIAGNOSIS — I10 ESSENTIAL HYPERTENSION: Primary | Chronic | ICD-10-CM

## 2020-07-20 DIAGNOSIS — M16.52 POST-TRAUMATIC OSTEOARTHRITIS OF LEFT HIP: Chronic | ICD-10-CM

## 2020-07-20 DIAGNOSIS — H90.6 MIXED CONDUCTIVE AND SENSORINEURAL HEARING LOSS OF BOTH EARS: Chronic | ICD-10-CM

## 2020-07-20 DIAGNOSIS — Z00.00 MEDICARE ANNUAL WELLNESS VISIT, INITIAL: ICD-10-CM

## 2020-07-20 LAB
ALBUMIN SERPL-MCNC: 3.9 G/DL (ref 3.5–5.2)
ALBUMIN/GLOB SERPL: 1.1 G/DL
ALP SERPL-CCNC: 60 U/L (ref 39–117)
ALT SERPL W P-5'-P-CCNC: 8 U/L (ref 1–41)
ANION GAP SERPL CALCULATED.3IONS-SCNC: 13.2 MMOL/L (ref 5–15)
AST SERPL-CCNC: 20 U/L (ref 1–40)
BILIRUB SERPL-MCNC: 0.4 MG/DL (ref 0–1.2)
BUN SERPL-MCNC: 22 MG/DL (ref 8–23)
BUN/CREAT SERPL: 19.6 (ref 7–25)
CALCIUM SPEC-SCNC: 10 MG/DL (ref 8.2–9.6)
CHLORIDE SERPL-SCNC: 106 MMOL/L (ref 98–107)
CO2 SERPL-SCNC: 21.8 MMOL/L (ref 22–29)
CREAT SERPL-MCNC: 1.12 MG/DL (ref 0.76–1.27)
GFR SERPL CREATININE-BSD FRML MDRD: 75 ML/MIN/1.73
GLOBULIN UR ELPH-MCNC: 3.6 GM/DL
GLUCOSE SERPL-MCNC: 98 MG/DL (ref 65–99)
MAGNESIUM SERPL-MCNC: 2.2 MG/DL (ref 1.6–2.4)
POTASSIUM SERPL-SCNC: 4 MMOL/L (ref 3.5–5.2)
PROT SERPL-MCNC: 7.5 G/DL (ref 6–8.5)
SODIUM SERPL-SCNC: 141 MMOL/L (ref 136–145)

## 2020-07-20 PROCEDURE — 83735 ASSAY OF MAGNESIUM: CPT | Performed by: FAMILY MEDICINE

## 2020-07-20 PROCEDURE — 99214 OFFICE O/P EST MOD 30 MIN: CPT | Performed by: FAMILY MEDICINE

## 2020-07-20 PROCEDURE — 36415 COLL VENOUS BLD VENIPUNCTURE: CPT | Performed by: FAMILY MEDICINE

## 2020-07-20 PROCEDURE — G0402 INITIAL PREVENTIVE EXAM: HCPCS | Performed by: FAMILY MEDICINE

## 2020-07-20 PROCEDURE — 80053 COMPREHEN METABOLIC PANEL: CPT | Performed by: FAMILY MEDICINE

## 2020-07-20 NOTE — PROGRESS NOTES
Subjective   Sterling Graves is a 89 y.o. male.  Reevaluation hypertension Eczemol DJD generalized senescence hearing loss.  In interim family is taking good care using social distancing a 1 person going in the house etc.  Has lost weight family member states recalls her not being able to monitor his food intake like they were pre-pandemic.  Counseled on same.  No falling issues.  Hearing gets worse.  Overall otherwise stable.  Time for lab work.    History of Present Illness   HPI    The following portions of the patient's history were reviewed and updated as appropriate: allergies, current medications, past family history, past medical history, past social history, past surgical history and problem list.    Review of Systems  Review of Systems   Constitutional: Negative for activity change, appetite change, fatigue and unexpected weight change.   HENT: Positive for hearing loss. Negative for trouble swallowing and voice change.    Eyes: Negative for redness and visual disturbance.   Respiratory: Negative for cough and wheezing.    Cardiovascular: Negative for chest pain and palpitations.   Gastrointestinal: Negative for abdominal pain, constipation, diarrhea, nausea and vomiting.   Genitourinary: Negative for urgency.   Musculoskeletal: Positive for arthralgias. Negative for joint swelling.   Neurological: Negative for syncope and headaches.   Hematological: Negative for adenopathy.   Psychiatric/Behavioral: Negative for sleep disturbance.       Objective   Physical Exam  Physical Exam   Constitutional: He is oriented to person, place, and time. He appears well-developed.   HENT:   Head: Normocephalic.   Eyes: Pupils are equal, round, and reactive to light.   Neck: Normal range of motion. No thyromegaly present.   Cardiovascular: Normal rate, regular rhythm, normal heart sounds and intact distal pulses. Exam reveals no gallop and no friction rub.   No murmur heard.  Pulmonary/Chest: Breath sounds normal.    Abdominal: Soft. He exhibits no distension and no mass. There is no tenderness.   Musculoskeletal: Normal range of motion.   Get up and go 5 seconds uses cane slightly bent at the waist.  Mild wide-based gait.   Neurological: He is alert and oriented to person, place, and time. He has normal reflexes.   Severe hearing loss   Skin: Skin is warm and dry.   Psychiatric: His affect is blunt. His speech is delayed. He is slowed. He exhibits abnormal recent memory.         Visit Vitals  /66   Pulse 99   Wt 57.2 kg (126 lb 1 oz)   SpO2 99%   BMI 19.74 kg/m²     Body mass index is 19.74 kg/m².      Assessment/Plan   Sterling was seen today for follow-up and hypertension.    Diagnoses and all orders for this visit:    Essential hypertension  -     Comprehensive Metabolic Panel  -     Magnesium    Mixed conductive and sensorineural hearing loss of both ears    Post-traumatic osteoarthritis of left hip    Conductive hearing loss, bilateral    Medicare annual wellness visit, initial    Counseled extensively on environmental control nutrition infection control home measures etc.  Family doing excellent job.  Counseled on hearing loss.  Lab work today recheck 6 months

## 2020-07-20 NOTE — PROGRESS NOTES
The ABCs of the Annual Wellness Visit  Initial Medicare Wellness Visit    Chief Complaint   Patient presents with   • Follow-up   • Hypertension       Subjective   History of Present Illness:  Sterling Graves is a 89 y.o. male who presents for an Initial Medicare Wellness Visit.    HEALTH RISK ASSESSMENT    Recent Hospitalizations:  No hospitalization(s) within the last year.    Current Medical Providers:  Patient Care Team:  Augustin Jones MD as PCP - General (Family Medicine)  Carolynn Webb MA as Medical Assistant    Smoking Status:  Social History     Tobacco Use   Smoking Status Never Smoker   Smokeless Tobacco Never Used       Alcohol Consumption:  Social History     Substance and Sexual Activity   Alcohol Use No       Depression Screen:   PHQ-2/PHQ-9 Depression Screening 7/20/2020   Little interest or pleasure in doing things 0   Feeling down, depressed, or hopeless 0   Total Score 0       Fall Risk Screen:  STEADI Fall Risk Assessment has not been completed.    Health Habits and Functional and Cognitive Screening:  No flowsheet data found.      Does the patient have evidence of cognitive impairment? Yes    Asprin use counseling:Does not need ASA (and currently is not on it)    Age-appropriate Screening Schedule:  Refer to the list below for future screening recommendations based on patient's age, sex and/or medical conditions. Orders for these recommended tests are listed in the plan section. The patient has been provided with a written plan.    Health Maintenance   Topic Date Due   • INFLUENZA VACCINE  08/01/2020   • TDAP/TD VACCINES (2 - Td) 04/05/2027   • ZOSTER VACCINE  Discontinued          The following portions of the patient's history were reviewed and updated as appropriate: allergies, current medications, past family history, past medical history, past social history, past surgical history and problem list.    Outpatient Medications Prior to Visit   Medication Sig Dispense Refill   •  bicalutamide (CASODEX) 50 MG chemo tablet Take 50 mg by mouth Daily.     • dutasteride (AVODART) 0.5 MG capsule Take 1 capsule by mouth Daily. 90 capsule 3   • esomeprazole (nexIUM) 40 MG capsule Take 1 capsule by mouth Daily. 90 capsule 3   • indapamide (LOZOL) 2.5 MG tablet Take 1 tablet by mouth Daily. 90 tablet 3   • metoprolol succinate XL (TOPROL-XL) 50 MG 24 hr tablet Take 1 tablet by mouth Daily. 90 tablet 3   • potassium chloride (K-DUR,KLOR-CON) 20 MEQ CR tablet Take 1 tablet by mouth Daily. 90 tablet 3   • triamcinolone (KENALOG) 0.5 % cream Apply  topically to the appropriate area as directed 3 (Three) Times a Day. For eczema x 2 wks then prn 60 g 5   • vitamin D (ERGOCALCIFEROL) 1.25 MG (27786 UT) capsule capsule 1 month 3 capsule 3     No facility-administered medications prior to visit.        Patient Active Problem List   Diagnosis   • Essential hypertension   • Eczema   • Benign prostatic hyperplasia   • Post-traumatic osteoarthritis of left knee   • Post-traumatic osteoarthritis of left hip   • Gastroesophageal reflux disease without esophagitis   • Mixed conductive and sensorineural hearing loss   • Prostate cancer (CMS/HCC)   • Conductive hearing loss, bilateral       Advanced Care Planning:  ACP discussion was held with the patient during this visit. Patient does not have an advance directive, information provided.    Review of Systems    Compared to one year ago, the patient feels his physical health is worse.  Compared to one year ago, the patient feels his mental health is worse.    Reviewed chart for potential of high risk medication in the elderly: yes  Reviewed chart for potential of harmful drug interactions in the elderly:yes    Objective         Vitals:    07/20/20 0803   BP: 128/66   Pulse: 99   SpO2: 99%   Weight: 57.2 kg (126 lb 1 oz)   PainSc: 0-No pain       Body mass index is 19.74 kg/m².  Discussed the patient's BMI with him. The BMI is in the acceptable range.    Physical  Exam          Assessment/Plan   Medicare Risks and Personalized Health Plan  CMS Preventative Services Quick Reference  Advance Directive Discussion  Fall Risk  Infection prevention    The above risks/problems have been discussed with the patient.  Pertinent information has been shared with the patient in the After Visit Summary.  Follow up plans and orders are seen below in the Assessment/Plan Section.    Diagnoses and all orders for this visit:    1. Essential hypertension (Primary)  -     Comprehensive Metabolic Panel  -     Magnesium    2. Mixed conductive and sensorineural hearing loss of both ears    3. Post-traumatic osteoarthritis of left hip    4. Conductive hearing loss, bilateral    5. Medicare annual wellness visit, initial      Follow Up:  No follow-ups on file.     An After Visit Summary and PPPS were given to the patient.     The patient does not have a history of falls. I did complete a risk assessment for falls. A plan of care for falls was documented.  See previous note  Worse mental and physical related mainly arthritis and hearing loss.

## 2020-08-03 ENCOUNTER — TRANSCRIBE ORDERS (OUTPATIENT)
Dept: LAB | Facility: HOSPITAL | Age: 85
End: 2020-08-03

## 2020-08-03 ENCOUNTER — LAB (OUTPATIENT)
Dept: LAB | Facility: HOSPITAL | Age: 85
End: 2020-08-03

## 2020-08-03 DIAGNOSIS — R53.83 FATIGUE, UNSPECIFIED TYPE: Primary | ICD-10-CM

## 2020-08-03 DIAGNOSIS — N39.0 URINARY TRACT INFECTION WITHOUT HEMATURIA, SITE UNSPECIFIED: ICD-10-CM

## 2020-08-03 LAB
ALBUMIN SERPL-MCNC: 4.1 G/DL (ref 3.5–5.2)
ALBUMIN/GLOB SERPL: 1.2 G/DL
ALP SERPL-CCNC: 65 U/L (ref 39–117)
ALT SERPL W P-5'-P-CCNC: 9 U/L (ref 1–41)
ANION GAP SERPL CALCULATED.3IONS-SCNC: 13.5 MMOL/L (ref 5–15)
AST SERPL-CCNC: 21 U/L (ref 1–40)
BASOPHILS # BLD AUTO: 0.05 10*3/MM3 (ref 0–0.2)
BASOPHILS NFR BLD AUTO: 0.8 % (ref 0–1.5)
BILIRUB SERPL-MCNC: 0.3 MG/DL (ref 0–1.2)
BILIRUB UR QL STRIP: ABNORMAL
BUN SERPL-MCNC: 16 MG/DL (ref 8–23)
BUN/CREAT SERPL: 15.2 (ref 7–25)
CALCIUM SPEC-SCNC: 10.2 MG/DL (ref 8.2–9.6)
CHLORIDE SERPL-SCNC: 100 MMOL/L (ref 98–107)
CLARITY UR: ABNORMAL
CO2 SERPL-SCNC: 22.5 MMOL/L (ref 22–29)
COLOR UR: ABNORMAL
CREAT SERPL-MCNC: 1.05 MG/DL (ref 0.76–1.27)
DEPRECATED RDW RBC AUTO: 42.4 FL (ref 37–54)
EOSINOPHIL # BLD AUTO: 0.1 10*3/MM3 (ref 0–0.4)
EOSINOPHIL NFR BLD AUTO: 1.7 % (ref 0.3–6.2)
ERYTHROCYTE [DISTWIDTH] IN BLOOD BY AUTOMATED COUNT: 13.7 % (ref 12.3–15.4)
ERYTHROCYTE [SEDIMENTATION RATE] IN BLOOD: 100 MM/HR (ref 0–20)
GFR SERPL CREATININE-BSD FRML MDRD: 80 ML/MIN/1.73
GLOBULIN UR ELPH-MCNC: 3.5 GM/DL
GLUCOSE SERPL-MCNC: 106 MG/DL (ref 65–99)
GLUCOSE UR STRIP-MCNC: NEGATIVE MG/DL
HCT VFR BLD AUTO: 37.7 % (ref 37.5–51)
HGB BLD-MCNC: 12.8 G/DL (ref 13–17.7)
HGB UR QL STRIP.AUTO: NEGATIVE
IMM GRANULOCYTES # BLD AUTO: 0.04 10*3/MM3 (ref 0–0.05)
IMM GRANULOCYTES NFR BLD AUTO: 0.7 % (ref 0–0.5)
KETONES UR QL STRIP: ABNORMAL
LEUKOCYTE ESTERASE UR QL STRIP.AUTO: ABNORMAL
LYMPHOCYTES # BLD AUTO: 1.77 10*3/MM3 (ref 0.7–3.1)
LYMPHOCYTES NFR BLD AUTO: 29.7 % (ref 19.6–45.3)
MCH RBC QN AUTO: 29.1 PG (ref 26.6–33)
MCHC RBC AUTO-ENTMCNC: 34 G/DL (ref 31.5–35.7)
MCV RBC AUTO: 85.7 FL (ref 79–97)
MONOCYTES # BLD AUTO: 0.98 10*3/MM3 (ref 0.1–0.9)
MONOCYTES NFR BLD AUTO: 16.5 % (ref 5–12)
NEUTROPHILS NFR BLD AUTO: 3.01 10*3/MM3 (ref 1.7–7)
NEUTROPHILS NFR BLD AUTO: 50.6 % (ref 42.7–76)
NITRITE UR QL STRIP: NEGATIVE
NRBC BLD AUTO-RTO: 0 /100 WBC (ref 0–0.2)
PH UR STRIP.AUTO: <=5 [PH] (ref 5–8)
PLATELET # BLD AUTO: 238 10*3/MM3 (ref 140–450)
PMV BLD AUTO: 9.4 FL (ref 6–12)
POTASSIUM SERPL-SCNC: 4.4 MMOL/L (ref 3.5–5.2)
PROT SERPL-MCNC: 7.6 G/DL (ref 6–8.5)
PROT UR QL STRIP: ABNORMAL
RBC # BLD AUTO: 4.4 10*6/MM3 (ref 4.14–5.8)
SODIUM SERPL-SCNC: 136 MMOL/L (ref 136–145)
SP GR UR STRIP: 1.03 (ref 1–1.03)
UROBILINOGEN UR QL STRIP: ABNORMAL
WBC # BLD AUTO: 5.95 10*3/MM3 (ref 3.4–10.8)

## 2020-08-03 PROCEDURE — 85652 RBC SED RATE AUTOMATED: CPT | Performed by: NURSE PRACTITIONER

## 2020-08-03 PROCEDURE — 81003 URINALYSIS AUTO W/O SCOPE: CPT | Performed by: NURSE PRACTITIONER

## 2020-08-03 PROCEDURE — 36415 COLL VENOUS BLD VENIPUNCTURE: CPT | Performed by: NURSE PRACTITIONER

## 2020-08-03 PROCEDURE — 80053 COMPREHEN METABOLIC PANEL: CPT | Performed by: NURSE PRACTITIONER

## 2020-08-03 PROCEDURE — 87086 URINE CULTURE/COLONY COUNT: CPT | Performed by: NURSE PRACTITIONER

## 2020-08-03 PROCEDURE — 85025 COMPLETE CBC W/AUTO DIFF WBC: CPT | Performed by: NURSE PRACTITIONER

## 2020-08-04 LAB — BACTERIA SPEC AEROBE CULT: NO GROWTH

## 2021-01-01 ENCOUNTER — TELEPHONE (OUTPATIENT)
Dept: FAMILY MEDICINE CLINIC | Facility: CLINIC | Age: 86
End: 2021-01-01

## 2021-01-01 ENCOUNTER — OFFICE VISIT (OUTPATIENT)
Dept: FAMILY MEDICINE CLINIC | Facility: CLINIC | Age: 86
End: 2021-01-01

## 2021-01-01 ENCOUNTER — TELEMEDICINE (OUTPATIENT)
Dept: FAMILY MEDICINE CLINIC | Facility: CLINIC | Age: 86
End: 2021-01-01

## 2021-01-01 ENCOUNTER — LAB (OUTPATIENT)
Dept: LAB | Facility: HOSPITAL | Age: 86
End: 2021-01-01

## 2021-01-01 VITALS
BODY MASS INDEX: 16.54 KG/M2 | HEIGHT: 67 IN | DIASTOLIC BLOOD PRESSURE: 64 MMHG | WEIGHT: 105.4 LBS | SYSTOLIC BLOOD PRESSURE: 110 MMHG

## 2021-01-01 VITALS
WEIGHT: 105 LBS | BODY MASS INDEX: 16.48 KG/M2 | HEIGHT: 67 IN | SYSTOLIC BLOOD PRESSURE: 108 MMHG | DIASTOLIC BLOOD PRESSURE: 62 MMHG

## 2021-01-01 DIAGNOSIS — H90.0 CONDUCTIVE HEARING LOSS, BILATERAL: Chronic | ICD-10-CM

## 2021-01-01 DIAGNOSIS — M62.50 MUSCULAR ATROPHY, UNSPECIFIED SITE: ICD-10-CM

## 2021-01-01 DIAGNOSIS — F03.90 SENILE DEMENTIA WITHOUT BEHAVIORAL DISTURBANCE (HCC): Chronic | ICD-10-CM

## 2021-01-01 DIAGNOSIS — K21.9 GASTROESOPHAGEAL REFLUX DISEASE WITHOUT ESOPHAGITIS: ICD-10-CM

## 2021-01-01 DIAGNOSIS — Z23 NEED FOR VACCINATION: ICD-10-CM

## 2021-01-01 DIAGNOSIS — M17.32 POST-TRAUMATIC OSTEOARTHRITIS OF LEFT KNEE: Chronic | ICD-10-CM

## 2021-01-01 DIAGNOSIS — L30.8 OTHER ECZEMA: Chronic | ICD-10-CM

## 2021-01-01 DIAGNOSIS — Z00.00 MEDICARE ANNUAL WELLNESS VISIT, SUBSEQUENT: ICD-10-CM

## 2021-01-01 DIAGNOSIS — F03.90 SENILE DEMENTIA WITHOUT BEHAVIORAL DISTURBANCE (HCC): Primary | Chronic | ICD-10-CM

## 2021-01-01 DIAGNOSIS — E55.9 VITAMIN D DEFICIENCY: ICD-10-CM

## 2021-01-01 DIAGNOSIS — M16.52 POST-TRAUMATIC OSTEOARTHRITIS OF LEFT HIP: Primary | Chronic | ICD-10-CM

## 2021-01-01 DIAGNOSIS — R64 CACHEXIA (HCC): Chronic | ICD-10-CM

## 2021-01-01 DIAGNOSIS — I10 ESSENTIAL HYPERTENSION: Primary | Chronic | ICD-10-CM

## 2021-01-01 DIAGNOSIS — M16.52 POST-TRAUMATIC OSTEOARTHRITIS OF LEFT HIP: Chronic | ICD-10-CM

## 2021-01-01 DIAGNOSIS — Z74.09 DECREASED AMBULATION STATUS: ICD-10-CM

## 2021-01-01 DIAGNOSIS — R64 CACHEXIA (HCC): ICD-10-CM

## 2021-01-01 DIAGNOSIS — M17.32 POST-TRAUMATIC OSTEOARTHRITIS OF LEFT KNEE: Primary | Chronic | ICD-10-CM

## 2021-01-01 LAB
ALBUMIN SERPL-MCNC: 3.8 G/DL (ref 3.5–5.2)
ALBUMIN/GLOB SERPL: 1 G/DL
ALP SERPL-CCNC: 104 U/L (ref 39–117)
ALT SERPL W P-5'-P-CCNC: <5 U/L (ref 1–41)
ANION GAP SERPL CALCULATED.3IONS-SCNC: 12.7 MMOL/L (ref 5–15)
AST SERPL-CCNC: 18 U/L (ref 1–40)
BILIRUB SERPL-MCNC: 0.4 MG/DL (ref 0–1.2)
BUN SERPL-MCNC: 24 MG/DL (ref 8–23)
BUN/CREAT SERPL: 20.3 (ref 7–25)
CALCIUM SPEC-SCNC: 9.9 MG/DL (ref 8.2–9.6)
CHLORIDE SERPL-SCNC: 103 MMOL/L (ref 98–107)
CO2 SERPL-SCNC: 23.3 MMOL/L (ref 22–29)
CREAT SERPL-MCNC: 1.18 MG/DL (ref 0.76–1.27)
DEPRECATED RDW RBC AUTO: 40.8 FL (ref 37–54)
ERYTHROCYTE [DISTWIDTH] IN BLOOD BY AUTOMATED COUNT: 13.9 % (ref 12.3–15.4)
GFR SERPL CREATININE-BSD FRML MDRD: 70 ML/MIN/1.73
GLOBULIN UR ELPH-MCNC: 3.9 GM/DL
GLUCOSE SERPL-MCNC: 86 MG/DL (ref 65–99)
HCT VFR BLD AUTO: 33.8 % (ref 37.5–51)
HGB BLD-MCNC: 11.1 G/DL (ref 13–17.7)
MCH RBC QN AUTO: 27 PG (ref 26.6–33)
MCHC RBC AUTO-ENTMCNC: 32.8 G/DL (ref 31.5–35.7)
MCV RBC AUTO: 82.2 FL (ref 79–97)
PLATELET # BLD AUTO: 320 10*3/MM3 (ref 140–450)
PMV BLD AUTO: 9.3 FL (ref 6–12)
POTASSIUM SERPL-SCNC: 4.6 MMOL/L (ref 3.5–5.2)
PROT SERPL-MCNC: 7.7 G/DL (ref 6–8.5)
RBC # BLD AUTO: 4.11 10*6/MM3 (ref 4.14–5.8)
SODIUM SERPL-SCNC: 139 MMOL/L (ref 136–145)
T4 FREE SERPL-MCNC: 1.25 NG/DL (ref 0.93–1.7)
TSH SERPL DL<=0.05 MIU/L-ACNC: 2.42 UIU/ML (ref 0.27–4.2)
WBC # BLD AUTO: 4.78 10*3/MM3 (ref 3.4–10.8)

## 2021-01-01 PROCEDURE — 99214 OFFICE O/P EST MOD 30 MIN: CPT | Performed by: FAMILY MEDICINE

## 2021-01-01 PROCEDURE — 85027 COMPLETE CBC AUTOMATED: CPT | Performed by: FAMILY MEDICINE

## 2021-01-01 PROCEDURE — 99213 OFFICE O/P EST LOW 20 MIN: CPT | Performed by: FAMILY MEDICINE

## 2021-01-01 PROCEDURE — 84443 ASSAY THYROID STIM HORMONE: CPT | Performed by: FAMILY MEDICINE

## 2021-01-01 PROCEDURE — G0439 PPPS, SUBSEQ VISIT: HCPCS | Performed by: FAMILY MEDICINE

## 2021-01-01 PROCEDURE — 90471 IMMUNIZATION ADMIN: CPT | Performed by: FAMILY MEDICINE

## 2021-01-01 PROCEDURE — 90662 IIV NO PRSV INCREASED AG IM: CPT | Performed by: FAMILY MEDICINE

## 2021-01-01 PROCEDURE — 84439 ASSAY OF FREE THYROXINE: CPT | Performed by: FAMILY MEDICINE

## 2021-01-01 PROCEDURE — 80053 COMPREHEN METABOLIC PANEL: CPT | Performed by: FAMILY MEDICINE

## 2021-01-01 PROCEDURE — 36415 COLL VENOUS BLD VENIPUNCTURE: CPT | Performed by: FAMILY MEDICINE

## 2021-01-01 RX ORDER — DUTASTERIDE 0.5 MG/1
0.5 CAPSULE, LIQUID FILLED ORAL DAILY
Qty: 90 CAPSULE | Refills: 3 | Status: SHIPPED | OUTPATIENT
Start: 2021-01-01 | End: 2021-01-01 | Stop reason: SDUPTHER

## 2021-01-01 RX ORDER — ERGOCALCIFEROL 1.25 MG/1
CAPSULE ORAL
Qty: 3 CAPSULE | Refills: 3 | Status: SHIPPED | OUTPATIENT
Start: 2021-01-01

## 2021-01-01 RX ORDER — ESOMEPRAZOLE MAGNESIUM 40 MG/1
40 CAPSULE, DELAYED RELEASE ORAL DAILY
Qty: 90 CAPSULE | Refills: 3 | Status: SHIPPED | OUTPATIENT
Start: 2021-01-01 | End: 2022-01-01

## 2021-01-01 RX ORDER — DUTASTERIDE 0.5 MG/1
0.5 CAPSULE, LIQUID FILLED ORAL DAILY
Qty: 90 CAPSULE | Refills: 3 | Status: SHIPPED | OUTPATIENT
Start: 2021-01-01

## 2021-01-01 RX ORDER — METOPROLOL SUCCINATE 50 MG/1
50 TABLET, EXTENDED RELEASE ORAL DAILY
Qty: 90 TABLET | Refills: 3 | Status: SHIPPED | OUTPATIENT
Start: 2021-01-01 | End: 2022-01-01

## 2021-01-01 RX ORDER — ERGOCALCIFEROL 1.25 MG/1
CAPSULE ORAL
Qty: 3 CAPSULE | Refills: 3 | Status: SHIPPED | OUTPATIENT
Start: 2021-01-01 | End: 2021-01-01 | Stop reason: SDUPTHER

## 2021-01-01 RX ORDER — ESOMEPRAZOLE MAGNESIUM 40 MG/1
40 CAPSULE, DELAYED RELEASE ORAL DAILY
Qty: 90 CAPSULE | Refills: 3 | Status: SHIPPED | OUTPATIENT
Start: 2021-01-01 | End: 2021-01-01 | Stop reason: SDUPTHER

## 2021-01-01 RX ORDER — METOPROLOL SUCCINATE 50 MG/1
50 TABLET, EXTENDED RELEASE ORAL DAILY
Qty: 90 TABLET | Refills: 3 | Status: SHIPPED | OUTPATIENT
Start: 2021-01-01 | End: 2021-01-01 | Stop reason: SDUPTHER

## 2021-01-13 ENCOUNTER — IMMUNIZATION (OUTPATIENT)
Dept: VACCINE CLINIC | Facility: HOSPITAL | Age: 86
End: 2021-01-13

## 2021-01-13 PROCEDURE — 0001A: CPT | Performed by: THORACIC SURGERY (CARDIOTHORACIC VASCULAR SURGERY)

## 2021-01-13 PROCEDURE — 91300 HC SARSCOV02 VAC 30MCG/0.3ML IM: CPT | Performed by: THORACIC SURGERY (CARDIOTHORACIC VASCULAR SURGERY)

## 2021-01-20 ENCOUNTER — OFFICE VISIT (OUTPATIENT)
Dept: FAMILY MEDICINE CLINIC | Facility: CLINIC | Age: 86
End: 2021-01-20

## 2021-01-20 VITALS
HEIGHT: 67 IN | BODY MASS INDEX: 16.89 KG/M2 | WEIGHT: 107.6 LBS | SYSTOLIC BLOOD PRESSURE: 100 MMHG | DIASTOLIC BLOOD PRESSURE: 60 MMHG

## 2021-01-20 DIAGNOSIS — F03.90 SENILE DEMENTIA WITHOUT BEHAVIORAL DISTURBANCE (HCC): ICD-10-CM

## 2021-01-20 DIAGNOSIS — H90.0 CONDUCTIVE HEARING LOSS, BILATERAL: Chronic | ICD-10-CM

## 2021-01-20 DIAGNOSIS — L30.8 OTHER ECZEMA: Chronic | ICD-10-CM

## 2021-01-20 DIAGNOSIS — C61 PROSTATE CANCER (HCC): Chronic | ICD-10-CM

## 2021-01-20 DIAGNOSIS — I10 ESSENTIAL HYPERTENSION: Primary | Chronic | ICD-10-CM

## 2021-01-20 PROCEDURE — 99214 OFFICE O/P EST MOD 30 MIN: CPT | Performed by: FAMILY MEDICINE

## 2021-01-20 NOTE — PROGRESS NOTES
Subjective   Sterling Graves is a 89 y.o. male. Follow-up mild hypertension history of prostate carcinoma hearing loss generalized senescence. In interim son states that patient is eating once a day becoming more withdrawn and can continues with progressive senile dementia. Is trying to use nutritional supplementation. Blood pressure is also creeping downward due to weight loss provider stop diuretic and potassium. Son does have power of  we have discussed living No home care in the past. Patient continues to be increasingly noncommunicative. History noted.    History of Present Illness   HPI    The following portions of the patient's history were reviewed and updated as appropriate: allergies, current medications, past family history, past medical history, past social history, past surgical history and problem list.    Review of Systems  Review of Systems   Constitutional: Positive for unexpected weight change. Negative for activity change, appetite change and fatigue.   HENT: Positive for hearing loss. Negative for trouble swallowing and voice change.    Eyes: Negative for redness and visual disturbance.   Respiratory: Negative for cough and wheezing.    Cardiovascular: Negative for chest pain and palpitations.   Gastrointestinal: Negative for abdominal pain, constipation, diarrhea, nausea and vomiting.   Genitourinary: Negative for urgency.   Musculoskeletal: Negative for joint swelling.   Neurological: Negative for syncope and headaches.   Hematological: Negative for adenopathy.   Psychiatric/Behavioral: Positive for decreased concentration. Negative for sleep disturbance.       Objective   Physical Exam  Physical Exam  Constitutional:       Appearance: He is well-developed.   HENT:      Head: Normocephalic.   Eyes:      Pupils: Pupils are equal, round, and reactive to light.   Neck:      Musculoskeletal: Normal range of motion.      Thyroid: No thyromegaly.   Cardiovascular:      Rate and  "Rhythm: Normal rate and regular rhythm.      Heart sounds: Normal heart sounds. No murmur. No friction rub. No gallop.    Pulmonary:      Breath sounds: Normal breath sounds.   Abdominal:      General: There is no distension.      Palpations: Abdomen is soft. There is no mass.      Tenderness: There is no abdominal tenderness.   Musculoskeletal: Normal range of motion.   Skin:     General: Skin is warm and dry.   Neurological:      Mental Status: He is alert and oriented to person, place, and time.      Deep Tendon Reflexes: Reflexes are normal and symmetric.   Psychiatric:         Mood and Affect: Affect is blunt.         Speech: He is noncommunicative.         Behavior: Behavior is slowed.         Cognition and Memory: Cognition is impaired.           Visit Vitals  /60   Ht 170.2 cm (67\")   Wt 48.8 kg (107 lb 9.6 oz)   BMI 16.85 kg/m²     Body mass index is 16.85 kg/m².      Assessment/Plan   Diagnoses and all orders for this visit:    1. Essential hypertension (Primary)    2. Conductive hearing loss, bilateral    3. Prostate cancer (CMS/HCC)    4. Other eczema  -     triamcinolone (KENALOG) 0.1 % ointment; Apply  topically to the appropriate area as directed 3 (Three) Times a Day.  Dispense: 80 g; Refill: 5    5. 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 Counseled on comfort measures fluids. Patient is in agreement sons in agreement. Continue as above stop above-mentioned medications recheck 6 months be time for lab and subsequent Medicare  "

## 2021-02-03 ENCOUNTER — IMMUNIZATION (OUTPATIENT)
Dept: VACCINE CLINIC | Facility: HOSPITAL | Age: 86
End: 2021-02-03

## 2021-02-03 PROCEDURE — 91300 HC SARSCOV02 VAC 30MCG/0.3ML IM: CPT | Performed by: THORACIC SURGERY (CARDIOTHORACIC VASCULAR SURGERY)

## 2021-02-03 PROCEDURE — 0002A: CPT | Performed by: THORACIC SURGERY (CARDIOTHORACIC VASCULAR SURGERY)

## 2021-07-20 PROBLEM — H90.8 MIXED CONDUCTIVE AND SENSORINEURAL HEARING LOSS: Chronic | Status: RESOLVED | Noted: 2018-04-18 | Resolved: 2021-01-01

## 2021-07-20 PROBLEM — F03.90 SENILE DEMENTIA WITHOUT BEHAVIORAL DISTURBANCE (HCC): Chronic | Status: ACTIVE | Noted: 2021-01-20

## 2021-07-20 PROBLEM — R64 CACHEXIA (HCC): Chronic | Status: ACTIVE | Noted: 2021-01-01

## 2021-07-20 PROBLEM — R64 CACHEXIA: Status: ACTIVE | Noted: 2021-01-01

## 2021-07-20 NOTE — PROGRESS NOTES
The ABCs of the Annual Wellness Visit  Subsequent Medicare Wellness Visit    Chief Complaint   Patient presents with   • Hypertension     6 month reval       Subjective   History of Present Illness:  Sterling Graves is a 89 y.o. male who presents for a Subsequent Medicare Wellness Visit.    HEALTH RISK ASSESSMENT    Recent Hospitalizations:  No hospitalization(s) within the last year.    Current Medical Providers:  Patient Care Team:  Augustin Jones MD as PCP - General (Family Medicine)  Carolynn Webb MA as Medical Assistant    Smoking Status:  Social History     Tobacco Use   Smoking Status Never Smoker   Smokeless Tobacco Never Used       Alcohol Consumption:  Social History     Substance and Sexual Activity   Alcohol Use No       Depression Screen:   PHQ-2/PHQ-9 Depression Screening 1/20/2021   Little interest or pleasure in doing things 0   Feeling down, depressed, or hopeless 0   Total Score 0       Fall Risk Screen:  MIRNA Fall Risk Assessment was completed, and patient is at HIGH risk for falls. Assessment completed on:1/20/2021    Health Habits and Functional and Cognitive Screening:  No flowsheet data found.      Does the patient have evidence of cognitive impairment? Yes    Asprin use counseling:Does not need ASA (and currently is not on it)    Age-appropriate Screening Schedule:  Refer to the list below for future screening recommendations based on patient's age, sex and/or medical conditions. Orders for these recommended tests are listed in the plan section. The patient has been provided with a written plan.    Health Maintenance   Topic Date Due   • INFLUENZA VACCINE  10/01/2021   • TDAP/TD VACCINES (2 - Td or Tdap) 04/05/2027   • ZOSTER VACCINE  Discontinued          The following portions of the patient's history were reviewed and updated as appropriate: allergies, current medications, past family history, past medical history, past social history, past surgical history and problem  "list.    Outpatient Medications Prior to Visit   Medication Sig Dispense Refill   • triamcinolone (KENALOG) 0.5 % cream Apply  topically to the appropriate area as directed 3 (Three) Times a Day. For eczema x 2 wks then prn 60 g 5   • dutasteride (AVODART) 0.5 MG capsule Take 1 capsule by mouth Daily. 90 capsule 3   • esomeprazole (nexIUM) 40 MG capsule Take 1 capsule by mouth Daily. 90 capsule 3   • metoprolol succinate XL (TOPROL-XL) 50 MG 24 hr tablet Take 1 tablet by mouth Daily. 90 tablet 3   • triamcinolone (KENALOG) 0.1 % ointment Apply  topically to the appropriate area as directed 3 (Three) Times a Day. 80 g 5   • vitamin D (ERGOCALCIFEROL) 1.25 MG (93488 UT) capsule capsule 1 month 3 capsule 3     No facility-administered medications prior to visit.       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 Patient has an advance directive (not in EMR), copy requested.    Review of Systems    Compared to one year ago, the patient feels his physical health is worse.  Compared to one year ago, the patient feels his mental health is worse.    Reviewed chart for potential of high risk medication in the elderly: yes  Reviewed chart for potential of harmful drug interactions in the elderly:yes    Objective         Vitals:    07/20/21 0746   BP: 110/64   Weight: 47.8 kg (105 lb 6.4 oz)   Height: 170.2 cm (67\")   PainSc: 0-No pain       Body mass index is 16.51 kg/m².  Discussed the patient's BMI with him. The BMI is below average; BMI management plan is completed.    Physical Exam      "     Assessment/Plan   Medicare Risks and Personalized Health Plan  CMS Preventative Services Quick Reference  Advance Directive Discussion  Dementia/Memory   Diabetic Lab Screening   Immunizations Discussed/Encouraged (specific immunizations; Influenza )    The above risks/problems have been discussed with the patient.  Pertinent information has been shared with the patient in the After Visit Summary.  Follow up plans and orders are seen below in the Assessment/Plan Section.    Diagnoses and all orders for this visit:    1. Essential hypertension (Primary)  -     Comprehensive Metabolic Panel  -     Discontinue: metoprolol succinate XL (TOPROL-XL) 50 MG 24 hr tablet; Take 1 tablet by mouth Daily.  Dispense: 90 tablet; Refill: 3  -     metoprolol succinate XL (TOPROL-XL) 50 MG 24 hr tablet; Take 1 tablet by mouth Daily.  Dispense: 90 tablet; Refill: 3    2. Conductive hearing loss, bilateral    3. Senile dementia without behavioral disturbance (CMS/[facility])    4. Medicare annual wellness visit, subsequent    5. Cachexia (CMS/[facility])  -     CBC (No Diff)  -     T4, Free  -     TSH    6. Gastroesophageal reflux disease without esophagitis  -     Discontinue: esomeprazole (nexIUM) 40 MG capsule; Take 1 capsule by mouth Daily.  Dispense: 90 capsule; Refill: 3  -     esomeprazole (nexIUM) 40 MG capsule; Take 1 capsule by mouth Daily.  Dispense: 90 capsule; Refill: 3    7. Other eczema  -     Discontinue: triamcinolone (KENALOG) 0.1 % ointment; Apply  topically to the appropriate area as directed 3 (Three) Times a Day.  Dispense: 454 g; Refill: 5  -     Discontinue: triamcinolone (KENALOG) 0.1 % ointment; Apply  topically to the appropriate area as directed 3 (Three) Times a Day.  Dispense: 454 g; Refill: 5  -     triamcinolone (KENALOG) 0.1 % ointment; Apply  topically to the appropriate area as directed 3 (Three) Times a Day.  Dispense: 454 g; Refill: 5    8. Vitamin D deficiency  -     Discontinue: vitamin D (ERGOCALCIFEROL)  1.25 MG (65378 UT) capsule capsule; 1 month  Dispense: 3 capsule; Refill: 3  -     vitamin D (ERGOCALCIFEROL) 1.25 MG (12892 UT) capsule capsule; 1 month  Dispense: 3 capsule; Refill: 3    Other orders  -     Discontinue: dutasteride (AVODART) 0.5 MG capsule; Take 1 capsule by mouth Daily.  Dispense: 90 capsule; Refill: 3  -     dutasteride (AVODART) 0.5 MG capsule; Take 1 capsule by mouth Daily.  Dispense: 90 capsule; Refill: 3      Follow Up:  No follow-ups on file.     An After Visit Summary and PPPS were given to the patient.

## 2021-07-20 NOTE — PROGRESS NOTES
Subjective   Sterling Graves is a 89 y.o. male.  Reevaluation mild hypertension senile dementia cachexia extreme age eczema.  Interim son still trying to give as much nutrition as possible but appetite slacks off in the afternoon.  Continues become more involutional.  Some overlay dementia some related to hearing.  Blood pressure is holding.  Is time for lab.    History of Present Illness   HPI    The following portions of the patient's history were reviewed and updated as appropriate: allergies, current medications, past family history, past medical history, past social history, past surgical history and problem list.    Review of Systems  Review of Systems   Constitutional: Positive for appetite change. Negative for activity change, fatigue and unexpected weight change.   HENT: Positive for hearing loss. Negative for trouble swallowing and voice change.    Eyes: Negative for redness and visual disturbance.   Respiratory: Negative for cough and wheezing.    Cardiovascular: Negative for chest pain and palpitations.   Gastrointestinal: Negative for abdominal pain, constipation, diarrhea, nausea and vomiting.   Genitourinary: Negative for urgency.   Musculoskeletal: Negative for joint swelling.   Neurological: Negative for syncope and headaches.   Hematological: Negative for adenopathy.   Psychiatric/Behavioral: Positive for confusion and decreased concentration. Negative for sleep disturbance.       Objective   Physical Exam  Physical Exam  Constitutional:       Appearance: He is well-developed.   HENT:      Head: Normocephalic.   Eyes:      Pupils: Pupils are equal, round, and reactive to light.   Cardiovascular:      Rate and Rhythm: Normal rate and regular rhythm.      Heart sounds: Normal heart sounds. No murmur heard.   No friction rub. No gallop.    Pulmonary:      Breath sounds: Normal breath sounds.   Abdominal:      Palpations: Abdomen is soft.   Musculoskeletal:         General: Normal range of  "motion.      Cervical back: Normal range of motion.      Comments: Up and go requiring assistance 6 to 10 seconds with cane slightly wide-based.  Extremely hard of hearing   Skin:     General: Skin is warm.   Neurological:      Mental Status: He is alert.   Psychiatric:         Mood and Affect: Affect is blunt.         Speech: He is noncommunicative.         Behavior: Behavior is slowed.         Cognition and Memory: Cognition is impaired.           Visit Vitals  /64   Ht 170.2 cm (67\")   Wt 47.8 kg (105 lb 6.4 oz)   BMI 16.51 kg/m²     Body mass index is 16.51 kg/m².  /64   Ht 170.2 cm (67\")   Wt 47.8 kg (105 lb 6.4 oz)   BMI 16.51 kg/m²       Assessment/Plan   Diagnoses and all orders for this visit:    1. Essential hypertension (Primary)  -     Comprehensive Metabolic Panel  -     Discontinue: metoprolol succinate XL (TOPROL-XL) 50 MG 24 hr tablet; Take 1 tablet by mouth Daily.  Dispense: 90 tablet; Refill: 3  -     metoprolol succinate XL (TOPROL-XL) 50 MG 24 hr tablet; Take 1 tablet by mouth Daily.  Dispense: 90 tablet; Refill: 3    2. Conductive hearing loss, bilateral    3. Senile dementia without behavioral disturbance (CMS/[facility])    4. Medicare annual wellness visit, subsequent    5. Cachexia (CMS/HCC)  -     CBC (No Diff)  -     T4, Free  -     TSH    6. Gastroesophageal reflux disease without esophagitis  -     Discontinue: esomeprazole (nexIUM) 40 MG capsule; Take 1 capsule by mouth Daily.  Dispense: 90 capsule; Refill: 3  -     esomeprazole (nexIUM) 40 MG capsule; Take 1 capsule by mouth Daily.  Dispense: 90 capsule; Refill: 3    7. Other eczema  -     Discontinue: triamcinolone (KENALOG) 0.1 % ointment; Apply  topically to the appropriate area as directed 3 (Three) Times a Day.  Dispense: 454 g; Refill: 5  -     Discontinue: triamcinolone (KENALOG) 0.1 % ointment; Apply  topically to the appropriate area as directed 3 (Three) Times a Day.  Dispense: 454 g; Refill: 5  -     triamcinolone " (KENALOG) 0.1 % ointment; Apply  topically to the appropriate area as directed 3 (Three) Times a Day.  Dispense: 454 g; Refill: 5    8. Vitamin D deficiency  -     Discontinue: vitamin D (ERGOCALCIFEROL) 1.25 MG (12483 UT) capsule capsule; 1 month  Dispense: 3 capsule; Refill: 3  -     vitamin D (ERGOCALCIFEROL) 1.25 MG (71057 UT) capsule capsule; 1 month  Dispense: 3 capsule; Refill: 3    Other orders  -     Discontinue: dutasteride (AVODART) 0.5 MG capsule; Take 1 capsule by mouth Daily.  Dispense: 90 capsule; Refill: 3  -     dutasteride (AVODART) 0.5 MG capsule; Take 1 capsule by mouth Daily.  Dispense: 90 capsule; Refill: 3    Counseled son on long-term outlook being extremely poor counseled on making sure with the peck of  advanced directives which he has.  Counseled on scanning into computer.  Lab work today.  Flu vaccine in the fall continue comfort care at home recheck 6 months

## 2021-11-15 NOTE — TELEPHONE ENCOUNTER
Patient son called stating he has the info for the Lift Chair from [facility], he insisted on speaking directly to you.     Thanks

## 2021-11-18 NOTE — PROGRESS NOTES
Subjective   Sterling Graves is a 89 y.o. male.  Requesting durable medical equipment evaluation for son.  Patient apparently now is becoming more demented memory lapses etc. also not eating well more up muscle mass loss.  Combined with osteoarthritis is unable to get up out of a chair on his own.  Son has to get him up and help him move.  Does use a walker but not a Rollator.  Patient is unable to ambulate without help.  Would benefit from a motorized lift chair to start the ambulation process as well as followed by Rollator use to help cut down on fall risk.  Previous history noted.    History of Present Illness   HPI    The following portions of the patient's history were reviewed and updated as appropriate: allergies, current medications, past family history, past medical history, past social history, past surgical history and problem list.    Review of Systems  Review of Systems   Constitutional: Positive for appetite change. Negative for activity change, fatigue and unexpected weight change.   HENT: Negative for trouble swallowing and voice change.    Eyes: Negative for redness and visual disturbance.   Respiratory: Negative for cough and wheezing.    Cardiovascular: Negative for chest pain and palpitations.   Gastrointestinal: Negative for abdominal pain, constipation, diarrhea, nausea and vomiting.   Genitourinary: Negative for urgency.   Musculoskeletal: Positive for arthralgias and gait problem. Negative for joint swelling.   Neurological: Negative for syncope and headaches.   Hematological: Negative for adenopathy.   Psychiatric/Behavioral: Positive for confusion and decreased concentration. Negative for sleep disturbance.       Objective   Physical Exam  Physical Exam  HENT:      Head: Normocephalic.   Cardiovascular:      Rate and Rhythm: Normal rate.      Pulses: Normal pulses.   Musculoskeletal:      Comments: Marked proximal and distal muscle wasting especially in lower extremities.   "Osteoarthritic changes but no knees.  Unable to ambulate without assistance even standing.   Neurological:      Cranial Nerves: Cranial nerves are intact.      Sensory: Sensation is intact.      Motor: Weakness and atrophy present.      Deep Tendon Reflexes:      Reflex Scores:       Patellar reflexes are 1+ on the right side and 1+ on the left side.  Psychiatric:         Mood and Affect: Affect is blunt.         Speech: Speech is delayed.         Behavior: Behavior is slowed.         Cognition and Memory: He exhibits impaired recent memory and impaired remote memory.           Visit Vitals  /62   Ht 170.2 cm (67\")   Wt 47.6 kg (105 lb)   BMI 16.45 kg/m²     Body mass index is 16.45 kg/m².    /62   Ht 170.2 cm (67\")   Wt 47.6 kg (105 lb)   BMI 16.45 kg/m²     Assessment/Plan   Diagnoses and all orders for this visit:    1. Post-traumatic osteoarthritis of left hip (Primary)    2. Post-traumatic osteoarthritis of left knee    3. Senile dementia without behavioral disturbance (HCC)    4. Decreased ambulation status    5. Cachexia (HCC)    6. Need for vaccination  -     Fluzone High-Dose 65+yrs    Definite need of assistance to help with family care and prevent falling we written for both motorized lift chair and a Rollator with seat counseled the use of same.  Also counseled family briefly on the need for peck of  which they now have also looking into possible need for other alternative forms of a living as he progresses with his disability.  Keep regular follow-up  "

## 2021-12-22 NOTE — TELEPHONE ENCOUNTER
Sterling Son of the patient is needing to speak to Carolynn in regards of insurance purposes.    Thanks

## 2021-12-22 NOTE — PROGRESS NOTES
Subjective   Sterling Graves is a 89 y.o. male.     History of Present Illness You have chosen to receive care through a telehealth visit.  Do you consent to use a video/audio connection for your medical care today? Yes  HPI family requesting hospital bed with trapeze and air mattress.  Patient continues to suffer the ravages of senile dementia deconditioning osteoarthritis.  Is able to climb up and down steps at home in order to get to his bedroom.  Family is now caring for him at home and needs a hospital bed on the lower floor in order for him to be able to ambulate at all.  Also needs use over the top to help pull up and down.  Has severe muscle wasting as well as ravages of dementia.  Does remain alert most the time and does follow commands.  Hospital bed use would keep him in his home longer with help.    The following portions of the patient's history were reviewed and updated as appropriate: allergies, current medications, past family history, past medical history, past social history, past surgical history and problem list.    Review of Systems  Review of Systems   Constitutional: Positive for fatigue. Negative for activity change, appetite change and unexpected weight change.   HENT: Negative for trouble swallowing and voice change.    Eyes: Negative for redness and visual disturbance.   Respiratory: Negative for cough and wheezing.    Cardiovascular: Negative for chest pain and palpitations.   Gastrointestinal: Negative for abdominal pain, constipation, diarrhea, nausea and vomiting.   Genitourinary: Negative for urgency.   Musculoskeletal: Positive for arthralgias and gait problem. Negative for joint swelling.   Neurological: Positive for weakness. Negative for syncope and headaches.   Hematological: Negative for adenopathy.   Psychiatric/Behavioral: Positive for confusion and decreased concentration. Negative for sleep disturbance.       Objective   Physical Exam  Physical Exam  Musculoskeletal:       Comments: To knees with muscle wasting difficulty ambulating without help must use walker push up from chair family must help ambulate.   Neurological:      Mental Status: He is alert.   Psychiatric:         Mood and Affect: Affect is blunt.         Speech: Speech is delayed.         Behavior: Behavior is slowed.         Cognition and Memory: Cognition is impaired.           There were no vitals taken for this visit.  There is no height or weight on file to calculate BMI.  There were no vitals taken for this visit.  Last BMI 16.4 blood pressure 108/62    Assessment/Plan   Diagnoses and all orders for this visit:    1. 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 Post-traumatic osteoarthritis of left hip  -     Hospital Bed    3. Post-traumatic osteoarthritis of left knee  -     Hospital Bed    4. Cachexia (HCC)  -     Hospital Bed    5. Muscular atrophy, unspecified site  -     Hospital Bed      Order written for hospital bed with air mattress and trapeze.  Have counseled on home care versus other care options.  Family thinks they can handle at home with equipment.  This was an audio and video enabled telemedicine encounter. The time that was spent in reviewing the patient's chart and addressing their symptoms, diagnosis and treatment was 15 mins.

## 2022-01-01 ENCOUNTER — TELEPHONE (OUTPATIENT)
Dept: FAMILY MEDICINE CLINIC | Facility: CLINIC | Age: 87
End: 2022-01-01

## 2022-01-01 RX ORDER — ESOMEPRAZOLE MAGNESIUM 20 MG/1
40 FOR SUSPENSION ORAL
Qty: 90 PACKET | Refills: 3 | Status: SHIPPED | OUTPATIENT
Start: 2022-01-01

## 2022-02-21 NOTE — TELEPHONE ENCOUNTER
Mr. Star Frank called stating that he has a difficult time getting his father to take his medication in pill form and was wanting to know if he could get his medication in liquid form sent to [facility] mail order pharmacy. Mr. Star Frank feels he could get his dad to take liquid form better. Please call back @ [phone]